# Patient Record
Sex: MALE | Race: BLACK OR AFRICAN AMERICAN | NOT HISPANIC OR LATINO | Employment: OTHER
[De-identification: names, ages, dates, MRNs, and addresses within clinical notes are randomized per-mention and may not be internally consistent; named-entity substitution may affect disease eponyms.]

---

## 2017-01-09 ENCOUNTER — PRIOR ORIGINAL RECORDS (OUTPATIENT)
Dept: OTHER | Age: 67
End: 2017-01-09

## 2017-01-10 ENCOUNTER — MYAURORA ACCOUNT LINK (OUTPATIENT)
Dept: OTHER | Age: 67
End: 2017-01-10

## 2017-01-16 ENCOUNTER — PRIOR ORIGINAL RECORDS (OUTPATIENT)
Dept: OTHER | Age: 67
End: 2017-01-16

## 2017-01-23 ENCOUNTER — APPOINTMENT (OUTPATIENT)
Dept: PHYSICAL THERAPY | Facility: HOSPITAL | Age: 67
End: 2017-01-23
Attending: FAMILY MEDICINE

## 2017-01-23 ENCOUNTER — TELEPHONE (OUTPATIENT)
Dept: PHYSICAL THERAPY | Facility: HOSPITAL | Age: 67
End: 2017-01-23

## 2017-01-25 ENCOUNTER — APPOINTMENT (OUTPATIENT)
Dept: PHYSICAL THERAPY | Facility: HOSPITAL | Age: 67
End: 2017-01-25

## 2017-01-30 ENCOUNTER — APPOINTMENT (OUTPATIENT)
Dept: PHYSICAL THERAPY | Facility: HOSPITAL | Age: 67
End: 2017-01-30

## 2017-02-01 ENCOUNTER — APPOINTMENT (OUTPATIENT)
Dept: PHYSICAL THERAPY | Facility: HOSPITAL | Age: 67
End: 2017-02-01

## 2017-02-06 ENCOUNTER — APPOINTMENT (OUTPATIENT)
Dept: PHYSICAL THERAPY | Facility: HOSPITAL | Age: 67
End: 2017-02-06

## 2017-02-08 ENCOUNTER — APPOINTMENT (OUTPATIENT)
Dept: PHYSICAL THERAPY | Facility: HOSPITAL | Age: 67
End: 2017-02-08

## 2017-02-13 ENCOUNTER — APPOINTMENT (OUTPATIENT)
Dept: PHYSICAL THERAPY | Facility: HOSPITAL | Age: 67
End: 2017-02-13

## 2017-02-17 ENCOUNTER — PRIOR ORIGINAL RECORDS (OUTPATIENT)
Dept: OTHER | Age: 67
End: 2017-02-17

## 2017-10-23 ENCOUNTER — PRIOR ORIGINAL RECORDS (OUTPATIENT)
Dept: OTHER | Age: 67
End: 2017-10-23

## 2018-02-16 ENCOUNTER — PRIOR ORIGINAL RECORDS (OUTPATIENT)
Dept: OTHER | Age: 68
End: 2018-02-16

## 2018-02-16 LAB
ALBUMIN: 4.5 G/DL
ALT (SGPT): 77 U/L
AST (SGOT): 48 U/L
BILIRUBIN TOTAL: 0.5 MG/DL
BUN: 15 MG/DL
CALCIUM: 9.8 MG/DL
CHLORIDE: 103 MEQ/L
CHOLESTEROL, TOTAL: 233 MG/DL
CREATININE, SERUM: 0.9 MG/DL
GLUCOSE: 102 MG/DL
GLUCOSE: 102 MG/DL
HDL CHOLESTEROL: 63 MG/DL
HEMOGLOBIN A1C: 5.5 %
LDL CHOLESTEROL: 153 MG/DL
MAGNESIUM: 1.6 MG/DL
POTASSIUM, SERUM: 4.1 MEQ/L
PROTEIN, TOTAL: 7.2 G/DL
SGOT (AST): 48 IU/L
SGPT (ALT): 77 IU/L
SODIUM: 141 MEQ/L
TRIGLYCERIDES: 83 MG/DL
VITAMIN D 25-OH: 38 NG/ML

## 2018-10-26 ENCOUNTER — PRIOR ORIGINAL RECORDS (OUTPATIENT)
Dept: OTHER | Age: 68
End: 2018-10-26

## 2018-11-06 ENCOUNTER — MYAURORA ACCOUNT LINK (OUTPATIENT)
Dept: OTHER | Age: 68
End: 2018-11-06

## 2018-11-06 ENCOUNTER — PRIOR ORIGINAL RECORDS (OUTPATIENT)
Dept: OTHER | Age: 68
End: 2018-11-06

## 2018-11-24 ENCOUNTER — PRIOR ORIGINAL RECORDS (OUTPATIENT)
Dept: OTHER | Age: 68
End: 2018-11-24

## 2018-11-29 ENCOUNTER — PRIOR ORIGINAL RECORDS (OUTPATIENT)
Dept: OTHER | Age: 68
End: 2018-11-29

## 2018-11-29 ENCOUNTER — MYAURORA ACCOUNT LINK (OUTPATIENT)
Dept: OTHER | Age: 68
End: 2018-11-29

## 2018-11-29 LAB
ALBUMIN: 4.6 G/DL
ALKALINE PHOSPHATATE(ALK PHOS): 55 IU/L
ALT (SGPT): 44 U/L
AST (SGOT): 35 U/L
BILIRUBIN TOTAL: 0.7 MG/DL
BUN: 14 MG/DL
CALCIUM: 9.9 MG/DL
CHLORIDE: 105 MEQ/L
CHOLESTEROL, TOTAL: 240 MG/DL
CREATININE, SERUM: 1 MG/DL
FREE T4: 1.08 MG/DL
GLUCOSE: 103 MG/DL
GLUCOSE: 103 MG/DL
HDL CHOLESTEROL: 80 MG/DL
HEMOGLOBIN A1C: 5.6 %
LDL CHOLESTEROL: 145 MG/DL
POTASSIUM, SERUM: 4.4 MEQ/L
PROTEIN, TOTAL: 7.8 G/DL
SODIUM: 148 MEQ/L
TRIGLYCERIDES: 76 MG/DL

## 2018-11-30 ENCOUNTER — MYAURORA ACCOUNT LINK (OUTPATIENT)
Dept: OTHER | Age: 68
End: 2018-11-30

## 2018-11-30 ENCOUNTER — PRIOR ORIGINAL RECORDS (OUTPATIENT)
Dept: OTHER | Age: 68
End: 2018-11-30

## 2018-12-01 ENCOUNTER — PRIOR ORIGINAL RECORDS (OUTPATIENT)
Dept: OTHER | Age: 68
End: 2018-12-01

## 2019-02-06 ENCOUNTER — PRIOR ORIGINAL RECORDS (OUTPATIENT)
Dept: OTHER | Age: 69
End: 2019-02-06

## 2019-02-06 ENCOUNTER — MYAURORA ACCOUNT LINK (OUTPATIENT)
Dept: OTHER | Age: 69
End: 2019-02-06

## 2019-02-25 ENCOUNTER — PRIOR ORIGINAL RECORDS (OUTPATIENT)
Dept: OTHER | Age: 69
End: 2019-02-25

## 2019-02-28 VITALS
HEART RATE: 64 BPM | HEIGHT: 74 IN | DIASTOLIC BLOOD PRESSURE: 92 MMHG | WEIGHT: 203 LBS | BODY MASS INDEX: 26.05 KG/M2 | SYSTOLIC BLOOD PRESSURE: 186 MMHG | RESPIRATION RATE: 16 BRPM

## 2019-02-28 VITALS
BODY MASS INDEX: 26.31 KG/M2 | HEART RATE: 64 BPM | SYSTOLIC BLOOD PRESSURE: 119 MMHG | HEIGHT: 74 IN | WEIGHT: 205 LBS | DIASTOLIC BLOOD PRESSURE: 66 MMHG

## 2019-02-28 VITALS
BODY MASS INDEX: 27.17 KG/M2 | HEART RATE: 62 BPM | DIASTOLIC BLOOD PRESSURE: 88 MMHG | WEIGHT: 205 LBS | RESPIRATION RATE: 16 BRPM | SYSTOLIC BLOOD PRESSURE: 162 MMHG | HEIGHT: 73 IN

## 2019-02-28 VITALS
SYSTOLIC BLOOD PRESSURE: 160 MMHG | DIASTOLIC BLOOD PRESSURE: 96 MMHG | HEART RATE: 80 BPM | HEIGHT: 73 IN | WEIGHT: 202 LBS | BODY MASS INDEX: 26.77 KG/M2

## 2019-03-01 VITALS
WEIGHT: 199.5 LBS | HEART RATE: 59 BPM | HEIGHT: 74 IN | SYSTOLIC BLOOD PRESSURE: 129 MMHG | DIASTOLIC BLOOD PRESSURE: 59 MMHG | RESPIRATION RATE: 16 BRPM | BODY MASS INDEX: 25.6 KG/M2

## 2019-03-01 VITALS
SYSTOLIC BLOOD PRESSURE: 130 MMHG | DIASTOLIC BLOOD PRESSURE: 74 MMHG | HEART RATE: 64 BPM | RESPIRATION RATE: 16 BRPM | BODY MASS INDEX: 26.18 KG/M2 | WEIGHT: 204 LBS | HEIGHT: 74 IN

## 2019-03-04 RX ORDER — LOSARTAN POTASSIUM AND HYDROCHLOROTHIAZIDE 12.5; 1 MG/1; MG/1
TABLET ORAL
COMMUNITY
Start: 2018-10-26 | End: 2021-06-22 | Stop reason: ALTCHOICE

## 2019-03-04 RX ORDER — METHYLDOPA 250 MG
TABLET ORAL
COMMUNITY
Start: 2017-01-09 | End: 2020-06-04

## 2019-03-04 RX ORDER — DESVENLAFAXINE 50 MG/1
TABLET, EXTENDED RELEASE ORAL
COMMUNITY
Start: 2018-11-06 | End: 2019-07-12 | Stop reason: CLARIF

## 2019-03-04 RX ORDER — VITAMIN B COMPLEX
TABLET ORAL
COMMUNITY
Start: 2017-01-09

## 2019-03-04 RX ORDER — AMLODIPINE BESYLATE 10 MG/1
TABLET ORAL
COMMUNITY
Start: 2018-12-14 | End: 2019-04-12 | Stop reason: ALTCHOICE

## 2019-03-08 ENCOUNTER — OFFICE VISIT (OUTPATIENT)
Dept: CARDIOLOGY | Age: 69
End: 2019-03-08

## 2019-03-08 VITALS
BODY MASS INDEX: 26.64 KG/M2 | WEIGHT: 201 LBS | HEART RATE: 68 BPM | SYSTOLIC BLOOD PRESSURE: 110 MMHG | DIASTOLIC BLOOD PRESSURE: 70 MMHG | HEIGHT: 73 IN

## 2019-03-08 DIAGNOSIS — S15.092A: ICD-10-CM

## 2019-03-08 DIAGNOSIS — I25.10 CAD IN NATIVE ARTERY: Primary | ICD-10-CM

## 2019-03-08 DIAGNOSIS — E78.00 PURE HYPERCHOLESTEROLEMIA: ICD-10-CM

## 2019-03-08 DIAGNOSIS — I10 ELEVATED BLOOD PRESSURE READING IN OFFICE WITH WHITE COAT SYNDROME, WITH DIAGNOSIS OF HYPERTENSION: ICD-10-CM

## 2019-03-08 PROCEDURE — 99214 OFFICE O/P EST MOD 30 MIN: CPT | Performed by: INTERNAL MEDICINE

## 2019-03-08 PROCEDURE — 3074F SYST BP LT 130 MM HG: CPT | Performed by: INTERNAL MEDICINE

## 2019-03-08 PROCEDURE — 3078F DIAST BP <80 MM HG: CPT | Performed by: INTERNAL MEDICINE

## 2019-03-08 SDOH — HEALTH STABILITY: PHYSICAL HEALTH: ON AVERAGE, HOW MANY DAYS PER WEEK DO YOU ENGAGE IN MODERATE TO STRENUOUS EXERCISE (LIKE A BRISK WALK)?: 0 DAYS

## 2019-03-08 SDOH — HEALTH STABILITY: MENTAL HEALTH: HOW OFTEN DO YOU HAVE A DRINK CONTAINING ALCOHOL?: NEVER

## 2019-03-08 SDOH — HEALTH STABILITY: PHYSICAL HEALTH: ON AVERAGE, HOW MANY MINUTES DO YOU ENGAGE IN EXERCISE AT THIS LEVEL?: 0 MIN

## 2019-03-08 ASSESSMENT — ENCOUNTER SYMPTOMS
CHILLS: 0
BRUISES/BLEEDS EASILY: 0
HEMATOCHEZIA: 0
HEMOPTYSIS: 0
COUGH: 0
WEIGHT GAIN: 0
WEIGHT LOSS: 0
ALLERGIC/IMMUNOLOGIC COMMENTS: NO NEW FOOD ALLERGIES
FEVER: 0
SUSPICIOUS LESIONS: 0

## 2019-03-19 ENCOUNTER — DOCUMENTATION (OUTPATIENT)
Dept: CARDIOLOGY | Age: 69
End: 2019-03-19

## 2019-03-21 ENCOUNTER — TELEPHONE (OUTPATIENT)
Dept: CARDIOLOGY | Age: 69
End: 2019-03-21

## 2019-04-12 ENCOUNTER — OFFICE VISIT (OUTPATIENT)
Dept: CARDIOLOGY | Age: 69
End: 2019-04-12

## 2019-04-12 VITALS
HEIGHT: 73 IN | BODY MASS INDEX: 27.04 KG/M2 | RESPIRATION RATE: 16 BRPM | DIASTOLIC BLOOD PRESSURE: 100 MMHG | HEART RATE: 64 BPM | SYSTOLIC BLOOD PRESSURE: 168 MMHG | WEIGHT: 204 LBS

## 2019-04-12 DIAGNOSIS — E78.00 PURE HYPERCHOLESTEROLEMIA: ICD-10-CM

## 2019-04-12 DIAGNOSIS — I25.10 CAD IN NATIVE ARTERY: Primary | ICD-10-CM

## 2019-04-12 DIAGNOSIS — I10 WHITE COAT SYNDROME WITH DIAGNOSIS OF HYPERTENSION: ICD-10-CM

## 2019-04-12 DIAGNOSIS — I10 ELEVATED BLOOD PRESSURE READING IN OFFICE WITH WHITE COAT SYNDROME, WITH DIAGNOSIS OF HYPERTENSION: ICD-10-CM

## 2019-04-12 PROCEDURE — 3080F DIAST BP >= 90 MM HG: CPT | Performed by: NURSE PRACTITIONER

## 2019-04-12 PROCEDURE — 99214 OFFICE O/P EST MOD 30 MIN: CPT | Performed by: NURSE PRACTITIONER

## 2019-04-12 PROCEDURE — 3077F SYST BP >= 140 MM HG: CPT | Performed by: NURSE PRACTITIONER

## 2019-04-12 RX ORDER — MAGNESIUM OXIDE 400 MG/1
400 TABLET ORAL
COMMUNITY

## 2019-04-12 RX ORDER — HYDROCODONE BITARTRATE AND ACETAMINOPHEN 5; 325 MG/1; MG/1
1 TABLET ORAL
COMMUNITY
Start: 2019-04-03 | End: 2019-07-12 | Stop reason: CLARIF

## 2019-04-12 RX ORDER — HYDRALAZINE HYDROCHLORIDE 25 MG/1
25 TABLET, FILM COATED ORAL 2 TIMES DAILY
Qty: 60 TABLET | Refills: 1 | Status: SHIPPED | OUTPATIENT
Start: 2019-04-12 | End: 2019-05-07 | Stop reason: SDUPTHER

## 2019-04-12 RX ORDER — TADALAFIL 5 MG/1
5 TABLET ORAL
COMMUNITY

## 2019-04-12 RX ORDER — MULTIVITAMIN
1 TABLET ORAL
COMMUNITY

## 2019-04-12 RX ORDER — NICOTINE 14MG/24HR
250 PATCH, TRANSDERMAL 24 HOURS TRANSDERMAL
COMMUNITY

## 2019-04-12 SDOH — HEALTH STABILITY: MENTAL HEALTH: HOW OFTEN DO YOU HAVE A DRINK CONTAINING ALCOHOL?: NEVER

## 2019-05-07 DIAGNOSIS — I10 ELEVATED BLOOD PRESSURE READING IN OFFICE WITH WHITE COAT SYNDROME, WITH DIAGNOSIS OF HYPERTENSION: ICD-10-CM

## 2019-05-08 RX ORDER — HYDRALAZINE HYDROCHLORIDE 25 MG/1
TABLET, FILM COATED ORAL
Qty: 60 TABLET | Refills: 5 | Status: SHIPPED | OUTPATIENT
Start: 2019-05-08 | End: 2019-05-24

## 2019-05-14 DIAGNOSIS — I10 ELEVATED BLOOD PRESSURE READING IN OFFICE WITH WHITE COAT SYNDROME, WITH DIAGNOSIS OF HYPERTENSION: ICD-10-CM

## 2019-05-14 RX ORDER — HYDRALAZINE HYDROCHLORIDE 25 MG/1
25 TABLET, FILM COATED ORAL 2 TIMES DAILY
Qty: 60 TABLET | Refills: 5 | OUTPATIENT
Start: 2019-05-14

## 2019-05-15 DIAGNOSIS — I10 ELEVATED BLOOD PRESSURE READING IN OFFICE WITH WHITE COAT SYNDROME, WITH DIAGNOSIS OF HYPERTENSION: ICD-10-CM

## 2019-05-15 RX ORDER — HYDRALAZINE HYDROCHLORIDE 25 MG/1
25 TABLET, FILM COATED ORAL 2 TIMES DAILY
Qty: 60 TABLET | Refills: 5 | OUTPATIENT
Start: 2019-05-15

## 2019-05-22 ENCOUNTER — ANCILLARY PROCEDURE (OUTPATIENT)
Dept: CARDIOLOGY | Age: 69
End: 2019-05-22
Attending: NURSE PRACTITIONER

## 2019-05-22 DIAGNOSIS — I10 ELEVATED BLOOD PRESSURE READING IN OFFICE WITH WHITE COAT SYNDROME, WITH DIAGNOSIS OF HYPERTENSION: ICD-10-CM

## 2019-05-22 PROCEDURE — 93975 VASCULAR STUDY: CPT | Performed by: INTERNAL MEDICINE

## 2019-05-24 ENCOUNTER — OFFICE VISIT (OUTPATIENT)
Dept: CARDIOLOGY | Age: 69
End: 2019-05-24

## 2019-05-24 VITALS
SYSTOLIC BLOOD PRESSURE: 124 MMHG | BODY MASS INDEX: 27.3 KG/M2 | HEIGHT: 73 IN | DIASTOLIC BLOOD PRESSURE: 72 MMHG | HEART RATE: 64 BPM | RESPIRATION RATE: 16 BRPM | WEIGHT: 206 LBS

## 2019-05-24 DIAGNOSIS — E78.00 PURE HYPERCHOLESTEROLEMIA: ICD-10-CM

## 2019-05-24 DIAGNOSIS — I25.10 CAD IN NATIVE ARTERY: Primary | ICD-10-CM

## 2019-05-24 DIAGNOSIS — I10 ELEVATED BLOOD PRESSURE READING IN OFFICE WITH WHITE COAT SYNDROME, WITH DIAGNOSIS OF HYPERTENSION: ICD-10-CM

## 2019-05-24 PROCEDURE — 99214 OFFICE O/P EST MOD 30 MIN: CPT | Performed by: INTERNAL MEDICINE

## 2019-05-24 PROCEDURE — 3074F SYST BP LT 130 MM HG: CPT | Performed by: INTERNAL MEDICINE

## 2019-05-24 PROCEDURE — 3078F DIAST BP <80 MM HG: CPT | Performed by: INTERNAL MEDICINE

## 2019-05-24 SDOH — HEALTH STABILITY: PHYSICAL HEALTH: ON AVERAGE, HOW MANY MINUTES DO YOU ENGAGE IN EXERCISE AT THIS LEVEL?: 0 MIN

## 2019-05-24 SDOH — HEALTH STABILITY: MENTAL HEALTH: HOW OFTEN DO YOU HAVE A DRINK CONTAINING ALCOHOL?: NEVER

## 2019-05-24 SDOH — HEALTH STABILITY: PHYSICAL HEALTH: ON AVERAGE, HOW MANY DAYS PER WEEK DO YOU ENGAGE IN MODERATE TO STRENUOUS EXERCISE (LIKE A BRISK WALK)?: 0 DAYS

## 2019-05-24 ASSESSMENT — ENCOUNTER SYMPTOMS
HEMOPTYSIS: 0
FEVER: 0
BACK PAIN: 1
COUGH: 0
BRUISES/BLEEDS EASILY: 0
WEIGHT GAIN: 0
WEIGHT LOSS: 0
ALLERGIC/IMMUNOLOGIC COMMENTS: NO NEW FOOD ALLERGIES
CHILLS: 0
SUSPICIOUS LESIONS: 0
HEMATOCHEZIA: 0

## 2019-06-07 ENCOUNTER — ANCILLARY PROCEDURE (OUTPATIENT)
Dept: CARDIOLOGY | Age: 69
End: 2019-06-07
Attending: INTERNAL MEDICINE

## 2019-06-07 DIAGNOSIS — S15.092A: ICD-10-CM

## 2019-06-07 DIAGNOSIS — I25.10 CAD IN NATIVE ARTERY: ICD-10-CM

## 2019-06-07 DIAGNOSIS — I10 ELEVATED BLOOD PRESSURE READING IN OFFICE WITH WHITE COAT SYNDROME, WITH DIAGNOSIS OF HYPERTENSION: ICD-10-CM

## 2019-06-07 DIAGNOSIS — E78.00 PURE HYPERCHOLESTEROLEMIA: ICD-10-CM

## 2019-06-07 PROCEDURE — 93880 EXTRACRANIAL BILAT STUDY: CPT | Performed by: INTERNAL MEDICINE

## 2019-06-14 ENCOUNTER — APPOINTMENT (OUTPATIENT)
Dept: CARDIOLOGY | Age: 69
End: 2019-06-14

## 2019-06-14 ENCOUNTER — TELEPHONE (OUTPATIENT)
Dept: CARDIOLOGY | Age: 69
End: 2019-06-14

## 2019-06-19 ENCOUNTER — TELEPHONE (OUTPATIENT)
Dept: CARDIOLOGY | Age: 69
End: 2019-06-19

## 2019-07-05 LAB
ABSOLUTE IMMATURE GRANULOCYTES (OFFPRE24): NORMAL
ALBUMIN SERPL-MCNC: 4.3 G/DL
ALBUMIN/GLOB SERPL: NORMAL {RATIO}
ALP SERPL-CCNC: 62 U/L
ALT SERPL-CCNC: 69 U/L
ANION GAP SERPL CALC-SCNC: NORMAL MMOL/L
AST SERPL-CCNC: 48 U/L
BASO+EOS+MONOS # BLD: NORMAL 10*3/UL
BASO+EOS+MONOS NFR BLD: NORMAL %
BASOPHILS # BLD: NORMAL 10*3/UL
BASOPHILS NFR BLD: NORMAL %
BILIRUB SERPL-MCNC: 0.6 MG/DL
BUN SERPL-MCNC: 12 MG/DL
BUN/CREAT SERPL: NORMAL
CALCIUM SERPL-MCNC: 10 MG/DL
CHLORIDE SERPL-SCNC: 103 MMOL/L
CHOLEST SERPL-MCNC: 207 MG/DL
CHOLEST/HDLC SERPL: NORMAL {RATIO}
CK SERPL-CCNC: 387 U/L
CO2 SERPL-SCNC: NORMAL MMOL/L
CREAT SERPL-MCNC: 1 MG/DL
DIFFERENTIAL METHOD BLD: NORMAL
EOSINOPHIL # BLD: NORMAL 10*3/UL
EOSINOPHIL NFR BLD: NORMAL %
ERYTHROCYTE [DISTWIDTH] IN BLOOD: NORMAL %
EST. AVERAGE GLUCOSE BLD GHB EST-MCNC: NORMAL MG/DL
GLOBULIN SER-MCNC: NORMAL G/DL
GLUCOSE SERPL-MCNC: 101 MG/DL
HBA1C MFR BLD: 5.4 %
HBA1C MFR BLD: NORMAL % (ref 4.5–5.6)
HCT VFR BLD CALC: 38.5 %
HDLC SERPL-MCNC: 66 MG/DL
HGB BLD-MCNC: 12.8 G/DL
IMMATURE GRANULOCYTES (OFFPRE25): NORMAL
LDLC SERPL CALC-MCNC: 127 MG/DL
LENGTH OF FAST TIME PATIENT: NORMAL H
LENGTH OF FAST TIME PATIENT: NORMAL H
LYMPHOCYTES # BLD: NORMAL 10*3/UL
LYMPHOCYTES NFR BLD: NORMAL %
MAGNESIUM SERPL-MCNC: 1.7 MG/DL
MCH RBC QN AUTO: NORMAL PG
MCHC RBC AUTO-ENTMCNC: NORMAL G/DL
MCV RBC AUTO: NORMAL FL
MONOCYTES # BLD: NORMAL 10*3/UL
MONOCYTES NFR BLD: NORMAL %
MPV (OFFPRE2): NORMAL
NEUTROPHILS # BLD: NORMAL 10*3/UL
NEUTROPHILS NFR BLD: NORMAL %
NONHDLC SERPL-MCNC: 141 MG/DL
NRBC BLD MANUAL-RTO: NORMAL %
PLAT MORPH BLD: NORMAL
PLATELET # BLD: 165 10*3/UL
POTASSIUM SERPL-SCNC: 4.4 MMOL/L
PROT SERPL-MCNC: 7.2 G/DL
RBC # BLD: 4.34 10*6/UL
RBC MORPH BLD: NORMAL
SODIUM SERPL-SCNC: 140 MMOL/L
T4 FREE SERPL-MCNC: 1.06 NG/DL
TRIGL SERPL-MCNC: 68 MG/DL
TSH SERPL-ACNC: 2.45 M[IU]/L
VLDLC SERPL CALC-MCNC: NORMAL MG/DL
WBC # BLD: 3.4 10*3/UL
WBC MORPH BLD: NORMAL

## 2019-07-12 ENCOUNTER — OFFICE VISIT (OUTPATIENT)
Dept: CARDIOLOGY | Age: 69
End: 2019-07-12

## 2019-07-12 VITALS
HEART RATE: 60 BPM | HEIGHT: 73 IN | DIASTOLIC BLOOD PRESSURE: 100 MMHG | BODY MASS INDEX: 26.51 KG/M2 | SYSTOLIC BLOOD PRESSURE: 160 MMHG | WEIGHT: 200 LBS

## 2019-07-12 DIAGNOSIS — E78.00 PURE HYPERCHOLESTEROLEMIA: ICD-10-CM

## 2019-07-12 DIAGNOSIS — I65.23 BILATERAL CAROTID ARTERY STENOSIS: ICD-10-CM

## 2019-07-12 DIAGNOSIS — I10 ELEVATED BLOOD PRESSURE READING IN OFFICE WITH WHITE COAT SYNDROME, WITH DIAGNOSIS OF HYPERTENSION: ICD-10-CM

## 2019-07-12 DIAGNOSIS — I25.10 CAD IN NATIVE ARTERY: Primary | ICD-10-CM

## 2019-07-12 PROCEDURE — 3077F SYST BP >= 140 MM HG: CPT | Performed by: INTERNAL MEDICINE

## 2019-07-12 PROCEDURE — 99214 OFFICE O/P EST MOD 30 MIN: CPT | Performed by: INTERNAL MEDICINE

## 2019-07-12 PROCEDURE — 3080F DIAST BP >= 90 MM HG: CPT | Performed by: INTERNAL MEDICINE

## 2019-07-12 RX ORDER — CYANOCOBALAMIN (VITAMIN B-12) 2000 MCG
TABLET ORAL
COMMUNITY
End: 2022-09-29

## 2019-07-12 RX ORDER — NIACIN 500 MG/1
500 TABLET, EXTENDED RELEASE ORAL NIGHTLY
Qty: 30 TABLET | Refills: 11 | Status: SHIPPED | OUTPATIENT
Start: 2019-07-12 | End: 2020-03-02 | Stop reason: SDUPTHER

## 2019-07-12 ASSESSMENT — ENCOUNTER SYMPTOMS
COUGH: 0
HEMOPTYSIS: 0
ALLERGIC/IMMUNOLOGIC COMMENTS: NO NEW FOOD ALLERGIES
HEMATOCHEZIA: 0
WEIGHT GAIN: 0
FEVER: 0
BRUISES/BLEEDS EASILY: 0
WEIGHT LOSS: 0
CHILLS: 0
BACK PAIN: 1
SUSPICIOUS LESIONS: 0

## 2019-07-25 ENCOUNTER — IMAGING SERVICES (OUTPATIENT)
Dept: OTHER | Age: 69
End: 2019-07-25
Attending: PHYSICIAN ASSISTANT

## 2019-08-05 ENCOUNTER — CLINICAL ABSTRACT (OUTPATIENT)
Dept: CARDIOLOGY | Age: 69
End: 2019-08-05

## 2019-12-02 ENCOUNTER — OFFICE VISIT (OUTPATIENT)
Dept: GASTROENTEROLOGY | Facility: CLINIC | Age: 69
End: 2019-12-02
Payer: MEDICARE

## 2019-12-02 ENCOUNTER — TELEPHONE (OUTPATIENT)
Dept: GASTROENTEROLOGY | Facility: CLINIC | Age: 69
End: 2019-12-02

## 2019-12-02 VITALS
SYSTOLIC BLOOD PRESSURE: 137 MMHG | TEMPERATURE: 98 F | WEIGHT: 191.19 LBS | BODY MASS INDEX: 24.8 KG/M2 | DIASTOLIC BLOOD PRESSURE: 70 MMHG | HEIGHT: 73.5 IN | HEART RATE: 66 BPM

## 2019-12-02 DIAGNOSIS — Z12.11 ENCOUNTER FOR SCREENING COLONOSCOPY: ICD-10-CM

## 2019-12-02 DIAGNOSIS — R10.9 RIGHT SIDED ABDOMINAL PAIN: Primary | ICD-10-CM

## 2019-12-02 DIAGNOSIS — K21.9 GASTROESOPHAGEAL REFLUX DISEASE, ESOPHAGITIS PRESENCE NOT SPECIFIED: ICD-10-CM

## 2019-12-02 PROCEDURE — 99203 OFFICE O/P NEW LOW 30 MIN: CPT | Performed by: INTERNAL MEDICINE

## 2019-12-02 RX ORDER — HYDROCHLOROTHIAZIDE 12.5 MG/1
12.5 CAPSULE, GELATIN COATED ORAL DAILY
COMMUNITY

## 2019-12-02 RX ORDER — LOSARTAN POTASSIUM 100 MG/1
100 TABLET ORAL DAILY
COMMUNITY

## 2019-12-02 RX ORDER — GARLIC EXTRACT 500 MG
1 CAPSULE ORAL DAILY
COMMUNITY

## 2019-12-02 NOTE — PATIENT INSTRUCTIONS
NEGRA for Violette Busch EGD examination op report January 2017, colonoscopy report likely over 10 years ago    Make a follow-up appt with Dr Gonsales Listen in 4-6 months

## 2019-12-02 NOTE — TELEPHONE ENCOUNTER
Signed NEGRA form faxed to [facility] for pt's last EGD reports.      Fax: [phone]  Phone: [phone]

## 2019-12-04 NOTE — PROGRESS NOTES
HPI:    Patient ID: Diana Fletcher is a 78year old man who I believe I recognize from my previous practice out of [facility].    Very healthy gentleman, with history hypertension and dyslipidemia and spinal stenosis.     Mr. Rox Chávez currently with total cholesterol initially 233–240, recently 210  CMP results 1889–2975 showing AST and ALT as high as 58 and 91 on 5/31/2018, recently 52 and 51 on 1/27/2020.    =======================    6/10/2019  [facility] Biopsies taken of the duodenum. HPI    Review of Systems         Current Outpatient Medications   Medication Sig Dispense Refill   • hydrochlorothiazide 12.5 MG Oral Cap Take 12.5 mg by mouth daily.      • losartan 100 MG Oral Tab Take 100 encounter diagnosis)  Gastroesophageal reflux disease, esophagitis presence not specified    78year old man who I believe I recognize from my previous practice out of [facility].    Very healthy gentleman, with history hypertension and dys 2 previous colonoscopy examinations had shown colon polyps. · Reassuring CBC over at [facility] 7/5/2019  · I recommended colonoscopy examination. We discussed the rationale, nature and risks of colonoscopy examination.   Mr. Giovanna Colon will consider and we will d

## 2019-12-06 ENCOUNTER — OFFICE VISIT (OUTPATIENT)
Dept: CARDIOLOGY | Age: 69
End: 2019-12-06

## 2019-12-06 VITALS
HEIGHT: 73 IN | BODY MASS INDEX: 25.18 KG/M2 | RESPIRATION RATE: 16 BRPM | HEART RATE: 64 BPM | DIASTOLIC BLOOD PRESSURE: 70 MMHG | WEIGHT: 190 LBS | SYSTOLIC BLOOD PRESSURE: 128 MMHG

## 2019-12-06 DIAGNOSIS — I25.10 CAD IN NATIVE ARTERY: Primary | ICD-10-CM

## 2019-12-06 DIAGNOSIS — I10 ELEVATED BLOOD PRESSURE READING IN OFFICE WITH WHITE COAT SYNDROME, WITH DIAGNOSIS OF HYPERTENSION: ICD-10-CM

## 2019-12-06 DIAGNOSIS — E78.00 PURE HYPERCHOLESTEROLEMIA: ICD-10-CM

## 2019-12-06 PROCEDURE — 3074F SYST BP LT 130 MM HG: CPT | Performed by: INTERNAL MEDICINE

## 2019-12-06 PROCEDURE — 99214 OFFICE O/P EST MOD 30 MIN: CPT | Performed by: INTERNAL MEDICINE

## 2019-12-06 PROCEDURE — 3078F DIAST BP <80 MM HG: CPT | Performed by: INTERNAL MEDICINE

## 2019-12-06 SDOH — HEALTH STABILITY: MENTAL HEALTH: HOW OFTEN DO YOU HAVE A DRINK CONTAINING ALCOHOL?: NEVER

## 2019-12-06 SDOH — HEALTH STABILITY: PHYSICAL HEALTH: ON AVERAGE, HOW MANY MINUTES DO YOU ENGAGE IN EXERCISE AT THIS LEVEL?: 0 MIN

## 2019-12-06 SDOH — HEALTH STABILITY: PHYSICAL HEALTH: ON AVERAGE, HOW MANY DAYS PER WEEK DO YOU ENGAGE IN MODERATE TO STRENUOUS EXERCISE (LIKE A BRISK WALK)?: 0 DAYS

## 2019-12-06 ASSESSMENT — ENCOUNTER SYMPTOMS
COUGH: 0
ALLERGIC/IMMUNOLOGIC COMMENTS: NO NEW FOOD ALLERGIES
FEVER: 0
SUSPICIOUS LESIONS: 0
WEIGHT GAIN: 0
WEIGHT LOSS: 0
HEMATOCHEZIA: 0
CHILLS: 0
BACK PAIN: 1
BRUISES/BLEEDS EASILY: 0
HEMOPTYSIS: 0

## 2019-12-06 ASSESSMENT — PATIENT HEALTH QUESTIONNAIRE - PHQ9
SUM OF ALL RESPONSES TO PHQ9 QUESTIONS 1 AND 2: 0
1. LITTLE INTEREST OR PLEASURE IN DOING THINGS: NOT AT ALL
2. FEELING DOWN, DEPRESSED OR HOPELESS: NOT AT ALL
SUM OF ALL RESPONSES TO PHQ9 QUESTIONS 1 AND 2: 0

## 2019-12-16 ENCOUNTER — TELEPHONE (OUTPATIENT)
Dept: GASTROENTEROLOGY | Facility: CLINIC | Age: 69
End: 2019-12-16

## 2019-12-16 NOTE — TELEPHONE ENCOUNTER
I spoke to the pt and he was given Dr Spence's below recommendations and f/u instructions and verbalizes understanding.  He will call back if symptoms worsen or fail to improve

## 2019-12-16 NOTE — TELEPHONE ENCOUNTER
Patient requesting to speak with  regarding gas and fecal output when leaving out gas, happening throughout the day. Please call at:[phone],thanks.

## 2019-12-16 NOTE — TELEPHONE ENCOUNTER
Dr Dorothy Leggett with you was 12/02/19    About one week ago pt noted he started to experience a large amount of gas and non bloody diarrhea stools throughout the day.  He states it's mostly gas and small amounts of stool    About 90 days ago he became

## 2019-12-16 NOTE — TELEPHONE ENCOUNTER
Thank you Lizzeth Miles for speaking to Mr Kimberly Beal. Please call him and advise him that this change is from one of the vegetable or fruit regimens or juices he is taking. Some vegetables and fruits cause gas and diarrhea.   We had discussed during the recent office

## 2020-03-03 RX ORDER — NIACIN 500 MG/1
TABLET, EXTENDED RELEASE ORAL
Qty: 90 TABLET | Refills: 3 | Status: SHIPPED | OUTPATIENT
Start: 2020-03-03 | End: 2020-08-28 | Stop reason: SDUPTHER

## 2020-06-01 ENCOUNTER — ANCILLARY PROCEDURE (OUTPATIENT)
Dept: CARDIOLOGY | Age: 70
End: 2020-06-01
Attending: INTERNAL MEDICINE

## 2020-06-01 DIAGNOSIS — I65.23 BILATERAL CAROTID ARTERY STENOSIS: ICD-10-CM

## 2020-06-01 PROCEDURE — 93880 EXTRACRANIAL BILAT STUDY: CPT | Performed by: INTERNAL MEDICINE

## 2020-06-04 ENCOUNTER — OFFICE VISIT (OUTPATIENT)
Dept: CARDIOLOGY | Age: 70
End: 2020-06-04

## 2020-06-04 VITALS
SYSTOLIC BLOOD PRESSURE: 137 MMHG | WEIGHT: 187.8 LBS | HEART RATE: 48 BPM | DIASTOLIC BLOOD PRESSURE: 89 MMHG | BODY MASS INDEX: 24.78 KG/M2

## 2020-06-04 DIAGNOSIS — E78.00 PURE HYPERCHOLESTEROLEMIA: Primary | ICD-10-CM

## 2020-06-04 DIAGNOSIS — I25.10 CAD IN NATIVE ARTERY: ICD-10-CM

## 2020-06-04 PROCEDURE — 99442 TELEPHONE E&M BY PHYSICIAN EST PT NOT ORIG PREV 7 DAYS 11-20 MIN: CPT | Performed by: INTERNAL MEDICINE

## 2020-06-04 SDOH — HEALTH STABILITY: PHYSICAL HEALTH: ON AVERAGE, HOW MANY MINUTES DO YOU ENGAGE IN EXERCISE AT THIS LEVEL?: NOT ASKED

## 2020-06-04 SDOH — HEALTH STABILITY: MENTAL HEALTH: HOW OFTEN DO YOU HAVE A DRINK CONTAINING ALCOHOL?: NEVER

## 2020-06-04 SDOH — HEALTH STABILITY: PHYSICAL HEALTH: ON AVERAGE, HOW MANY DAYS PER WEEK DO YOU ENGAGE IN MODERATE TO STRENUOUS EXERCISE (LIKE A BRISK WALK)?: 5 DAYS

## 2020-06-04 ASSESSMENT — PATIENT HEALTH QUESTIONNAIRE - PHQ9
1. LITTLE INTEREST OR PLEASURE IN DOING THINGS: NOT AT ALL
CLINICAL INTERPRETATION OF PHQ2 SCORE: NO FURTHER SCREENING NEEDED
SUM OF ALL RESPONSES TO PHQ9 QUESTIONS 1 AND 2: 0
CLINICAL INTERPRETATION OF PHQ9 SCORE: NO FURTHER SCREENING NEEDED
SUM OF ALL RESPONSES TO PHQ9 QUESTIONS 1 AND 2: 0
2. FEELING DOWN, DEPRESSED OR HOPELESS: NOT AT ALL

## 2020-06-04 ASSESSMENT — ENCOUNTER SYMPTOMS
WEIGHT LOSS: 0
COUGH: 0
BRUISES/BLEEDS EASILY: 0
CHILLS: 0
HEMOPTYSIS: 0
SUSPICIOUS LESIONS: 0
ALLERGIC/IMMUNOLOGIC COMMENTS: NO NEW FOOD ALLERGIES
FEVER: 0
WEIGHT GAIN: 0
HEMATOCHEZIA: 0

## 2020-06-08 ENCOUNTER — OFFICE VISIT (OUTPATIENT)
Dept: GASTROENTEROLOGY | Facility: CLINIC | Age: 70
End: 2020-06-08
Payer: MEDICARE

## 2020-06-08 ENCOUNTER — LAB ENCOUNTER (OUTPATIENT)
Dept: LAB | Facility: HOSPITAL | Age: 70
End: 2020-06-08
Attending: INTERNAL MEDICINE
Payer: MEDICARE

## 2020-06-08 ENCOUNTER — TELEPHONE (OUTPATIENT)
Dept: GASTROENTEROLOGY | Facility: CLINIC | Age: 70
End: 2020-06-08

## 2020-06-08 VITALS
WEIGHT: 192.19 LBS | HEART RATE: 70 BPM | SYSTOLIC BLOOD PRESSURE: 192 MMHG | BODY MASS INDEX: 24.93 KG/M2 | HEIGHT: 73.5 IN | DIASTOLIC BLOOD PRESSURE: 85 MMHG

## 2020-06-08 DIAGNOSIS — R74.8 ABNORMAL TRANSAMINASES: ICD-10-CM

## 2020-06-08 DIAGNOSIS — R74.8 ABNORMAL TRANSAMINASES: Primary | ICD-10-CM

## 2020-06-08 DIAGNOSIS — R19.4 CHANGE IN BOWEL HABITS: ICD-10-CM

## 2020-06-08 DIAGNOSIS — R10.9 RIGHT SIDED ABDOMINAL PAIN: ICD-10-CM

## 2020-06-08 DIAGNOSIS — Z12.11 COLON CANCER SCREENING: Primary | ICD-10-CM

## 2020-06-08 PROCEDURE — 86706 HEP B SURFACE ANTIBODY: CPT

## 2020-06-08 PROCEDURE — 86708 HEPATITIS A ANTIBODY: CPT

## 2020-06-08 PROCEDURE — 36415 COLL VENOUS BLD VENIPUNCTURE: CPT

## 2020-06-08 PROCEDURE — 80500 HEPATITIS A B + C PROFILE: CPT

## 2020-06-08 PROCEDURE — 86709 HEPATITIS A IGM ANTIBODY: CPT

## 2020-06-08 PROCEDURE — 80053 COMPREHEN METABOLIC PANEL: CPT

## 2020-06-08 PROCEDURE — 99214 OFFICE O/P EST MOD 30 MIN: CPT | Performed by: INTERNAL MEDICINE

## 2020-06-08 PROCEDURE — 87340 HEPATITIS B SURFACE AG IA: CPT

## 2020-06-08 PROCEDURE — 86704 HEP B CORE ANTIBODY TOTAL: CPT

## 2020-06-08 PROCEDURE — 86803 HEPATITIS C AB TEST: CPT

## 2020-06-08 RX ORDER — TADALAFIL 5 MG/1
5 TABLET ORAL DAILY
COMMUNITY
Start: 2020-03-02

## 2020-06-08 NOTE — PROGRESS NOTES
HPI:    Patient ID: Vicky Yadav is a 78year old man who I have likely known for over 10 years who returns today for follow-up. In December 2019, Daisy Enamorado was very upset about new diagnosis of significant carotid vascular disease.   Part of his plan to diarrhea. We had discussed during the recent office visit cutting back on his supplements to the bare minimum. One of his supplements is magnesium which causes diarrhea. He should hold the magnesium for now if he has been taking it.   Same thing goes for at: [phone],[id number].      ======================    Previous visit 12/2/2019:    Kalee Soto is a 78year old man who I believe I recognize from my previous practice out of [facility].    Very healthy gentleman, with history hypertens received from [facility]:    CBCs 2858–2988 showing hemoglobin running 12.5g – 13.8g  Lipid profiles with total cholesterol initially 233–240, recently 210  CMP results 1962–0518 showing AST and ALT as high as 58 and 91 on 5/31/2018, recently 52 and 5  16-49% Right Internal Carotid Artery Stenosis. 2.  1-15% Left Internal Carotid Artery Stenosis. 3.  Patent vertebral arteries bilaterally with antegrade doppler flow. 4.  Abnormal thyroid tissue on the left, correlate clinically.   Compared to the previ (VITAMIN D3) 5000 units Oral Tab 1 tablet daily     • Cyanocobalamin (B-12) 2000 MCG Oral Tab Take by mouth. • Coenzyme Q10 (COQ10) 100 MG Oral Cap 1 capsule daily     • magnesium oxide 400 MG Oral Tab Take 400 mg by mouth daily.        • Multiple Vitam showing hemoglobin running 12.5g – 13.8g  Lipid profiles with total cholesterol initially 233–240, recently 210  CMP results [id number]–[id number] showing AST and ALT as high as 58 and 91 on 5/31/2018, recently 47 and 51 on 1/27/2020.    11/24/2018: AST 35 ALT 44 CPK high as 58 and 91 on 5/31/2018, recently 52 and 51 on 1/27/2020. · Labs reviewed above. · Today's Body mass index is 25.01 kg/m².    · Need to screen for Hepatitis B and C -- discussed and ordered today  · No recent abdominal imaging here or Leon[facility]/Jose Cruz

## 2020-06-08 NOTE — PATIENT INSTRUCTIONS
1.  Blood tests ordered today    2. Liver ultrasound ordered today    3. GI schedulers –    Please schedule colonoscopy exam at [facility] ([address])/ [place]    This patient IS appropriate for the [facility] endoscopy center.

## 2020-06-08 NOTE — TELEPHONE ENCOUNTER
Scheduled for:  Colonoscopy [address]  Provider Name:  Kojo Sherman  Date: 9/8/2020   Location: [facility]   Sedation:Mac    Time: 6703 Pm  (pt is aware to arrive at 1130 Am)   Prep:  Golytely Prep instructions were given to pt in the office, pt verbalized understanding

## 2020-06-16 ENCOUNTER — TELEPHONE (OUTPATIENT)
Dept: GASTROENTEROLOGY | Facility: CLINIC | Age: 70
End: 2020-06-16

## 2020-06-16 ENCOUNTER — APPOINTMENT (OUTPATIENT)
Dept: CARDIOLOGY | Age: 70
End: 2020-06-16

## 2020-06-16 NOTE — TELEPHONE ENCOUNTER
Patient requesting to speak with RN regarding lab results. Patient also requesting results to be sent to his [address]. Please call. Thank you.

## 2020-06-18 ENCOUNTER — HOSPITAL ENCOUNTER (OUTPATIENT)
Dept: ULTRASOUND IMAGING | Age: 70
Discharge: HOME | End: 2020-06-18
Attending: INTERNAL MEDICINE
Payer: MEDICARE

## 2020-06-18 DIAGNOSIS — R74.8 ABNORMAL TRANSAMINASES: ICD-10-CM

## 2020-06-18 PROCEDURE — 76705 ECHO EXAM OF ABDOMEN: CPT | Performed by: INTERNAL MEDICINE

## 2020-06-19 NOTE — TELEPHONE ENCOUNTER
Thanks Markell Shannon. Please call  Richardsonkiana Martinez to advise that the labs of 6/8/2020 looked good. CMP was normal with a minimally, borderline elevated glucose of 106. His liver enzymes looked good with AST 31 ALT 43.   This is a significant improvement compared to

## 2020-06-19 NOTE — TELEPHONE ENCOUNTER
I spoke to the pt and he was notified of Dr Spence's recommendations below and he verbalizes understanding

## 2020-06-26 ENCOUNTER — OFFICE VISIT (OUTPATIENT)
Dept: NEPHROLOGY | Facility: CLINIC | Age: 70
End: 2020-06-26
Payer: MEDICARE

## 2020-06-26 ENCOUNTER — APPOINTMENT (OUTPATIENT)
Dept: LAB | Facility: HOSPITAL | Age: 70
End: 2020-06-26
Attending: INTERNAL MEDICINE
Payer: MEDICARE

## 2020-06-26 VITALS
WEIGHT: 194.81 LBS | BODY MASS INDEX: 25.82 KG/M2 | DIASTOLIC BLOOD PRESSURE: 71 MMHG | HEIGHT: 73 IN | HEART RATE: 65 BPM | SYSTOLIC BLOOD PRESSURE: 161 MMHG

## 2020-06-26 DIAGNOSIS — I10 ESSENTIAL HYPERTENSION: ICD-10-CM

## 2020-06-26 DIAGNOSIS — R80.9 NON-NEPHROTIC RANGE PROTEINURIA: Primary | ICD-10-CM

## 2020-06-26 DIAGNOSIS — R80.9 NON-NEPHROTIC RANGE PROTEINURIA: ICD-10-CM

## 2020-06-26 PROBLEM — N18.2 CKD (CHRONIC KIDNEY DISEASE), STAGE II: Status: ACTIVE | Noted: 2020-06-26

## 2020-06-26 PROCEDURE — 99204 OFFICE O/P NEW MOD 45 MIN: CPT | Performed by: INTERNAL MEDICINE

## 2020-06-26 PROCEDURE — 82570 ASSAY OF URINE CREATININE: CPT

## 2020-06-26 PROCEDURE — 84156 ASSAY OF PROTEIN URINE: CPT

## 2020-06-26 PROCEDURE — 82043 UR ALBUMIN QUANTITATIVE: CPT

## 2020-06-26 PROCEDURE — 81001 URINALYSIS AUTO W/SCOPE: CPT

## 2020-06-26 NOTE — PATIENT INSTRUCTIONS
Urine test as ordered prior to next visit   Follow up in 4 weeks  Bring home blood pressure readings and monitor on next readings

## 2020-06-26 NOTE — PROGRESS NOTES
Consult Requested By: Dr. Esthela Xiong    Reason for Consult: Education on kidneys     HPI:     Patient is a 78 yrs old male with pmh of HTN x 30 yrs, hiatal hernia, spinal stenosis, anxiety who presented to get education about kidneys     Lab tests show Oral Tab Take by mouth. • Coenzyme Q10 (COQ10) 100 MG Oral Cap 1 capsule daily     • magnesium oxide 400 MG Oral Tab Take 400 mg by mouth daily. • Multiple Vitamin Oral Tab Take 1 tablet by mouth. • Milk Thistle 1000 MG Oral Cap daily.      • noted  Head/Face: normocephalic  Eyes/Vision: normal extraocular motion is intact  Nose/Mouth/Throat:mucous membranes are moist   Neck/Thyroid: neck is supple without adenopathy  Lymphatic: no abnormal cervical, supraclavicular adenopathy is noted  Respira

## 2020-06-29 ENCOUNTER — TELEPHONE (OUTPATIENT)
Dept: GASTROENTEROLOGY | Facility: CLINIC | Age: 70
End: 2020-06-29

## 2020-06-29 NOTE — TELEPHONE ENCOUNTER
I mailed out Ultrasound results letter to patient and entered a 1 yr recall for a U/S.     repeat the ultrasound exam in 1-2 years, per Dr Ojeda Dys letter 6/28/2020.

## 2020-07-17 ENCOUNTER — APPOINTMENT (OUTPATIENT)
Dept: CARDIOLOGY | Age: 70
End: 2020-07-17

## 2020-07-23 ENCOUNTER — OFFICE VISIT (OUTPATIENT)
Dept: NEPHROLOGY | Facility: CLINIC | Age: 70
End: 2020-07-23
Payer: MEDICARE

## 2020-07-23 VITALS
HEART RATE: 62 BPM | BODY MASS INDEX: 26.06 KG/M2 | SYSTOLIC BLOOD PRESSURE: 149 MMHG | WEIGHT: 196.63 LBS | HEIGHT: 73 IN | DIASTOLIC BLOOD PRESSURE: 80 MMHG | TEMPERATURE: 97 F

## 2020-07-23 DIAGNOSIS — I10 ESSENTIAL HYPERTENSION: Primary | ICD-10-CM

## 2020-07-23 PROCEDURE — 99213 OFFICE O/P EST LOW 20 MIN: CPT | Performed by: INTERNAL MEDICINE

## 2020-07-23 NOTE — PROGRESS NOTES
Progress Note:      Patient is a 78 yrs old male with pmh of HTN x 30 yrs, hiatal hernia, spinal stenosis, anxiety who presented for follow up     Lab tests showed BUN/Cr 7/1.03 mg/dl with an eGFR 85 ml/min. Serum potassium, albumin and calcium wnl.  Derik Cameron Oral Cap 1 capsule daily     • magnesium oxide 400 MG Oral Tab Take 400 mg by mouth daily. • Multiple Vitamin Oral Tab Take 1 tablet by mouth. • Ascorbic Acid (VITAMIN C) 1000 MG Oral Tab Take 1,000 mg by mouth daily.      • Na Sulfate-K Sulfate-M alert and responsive   Head/Face: normocephalic  Eyes/Vision: normal extraocular motion is intact  Nose/Mouth/Throat:mucous membranes are moist   Neck/Thyroid: neck is supple without adenopathy  Lymphatic: no abnormal cervical, supraclavicular adenopathy i

## 2020-08-28 RX ORDER — NIACIN 500 MG/1
500 TABLET, EXTENDED RELEASE ORAL NIGHTLY
Qty: 90 TABLET | Refills: 3 | Status: SHIPPED | OUTPATIENT
Start: 2020-08-28 | End: 2022-03-29

## 2020-09-02 ENCOUNTER — TELEPHONE (OUTPATIENT)
Dept: GASTROENTEROLOGY | Facility: CLINIC | Age: 70
End: 2020-09-02

## 2020-09-02 NOTE — TELEPHONE ENCOUNTER
Dr Ojeda Dys:    Han Huerta:    I spoke to the pt and let him know I sent his bowel prep to [facility].    Pt is uncomfortable with having the colonoscopy. He is concerned his COVID test can have a false/positive result.  He states COVID tests are only 20-30% accurate

## 2020-09-03 NOTE — TELEPHONE ENCOUNTER
Agree with advice given. Maybe Selwyn Sims would be reassured to know that we (doctors and nurses) are all screened with temperature checks every day and testing if we have any symptoms/ whenever we want.     All patients coming in for procedures are getting

## 2020-09-03 NOTE — TELEPHONE ENCOUNTER
Patient contacted and message from Dr. Remedios Mancia given. Patient voiced understanding and will keep his procedure appointment.

## 2020-09-05 ENCOUNTER — APPOINTMENT (OUTPATIENT)
Dept: LAB | Facility: HOSPITAL | Age: 70
End: 2020-09-05
Attending: INTERNAL MEDICINE
Payer: MEDICARE

## 2020-09-05 DIAGNOSIS — Z01.818 PRE-OP TESTING: ICD-10-CM

## 2020-09-07 LAB — SARS-COV-2 RNA RESP QL NAA+PROBE: NOT DETECTED

## 2020-09-08 ENCOUNTER — ANESTHESIA EVENT (OUTPATIENT)
Dept: ENDOSCOPY | Facility: HOSPITAL | Age: 70
End: 2020-09-08
Payer: MEDICARE

## 2020-09-08 ENCOUNTER — ANESTHESIA (OUTPATIENT)
Dept: ENDOSCOPY | Facility: HOSPITAL | Age: 70
End: 2020-09-08
Payer: MEDICARE

## 2020-09-08 ENCOUNTER — HOSPITAL ENCOUNTER (OUTPATIENT)
Facility: HOSPITAL | Age: 70
Setting detail: HOSPITAL OUTPATIENT SURGERY
Discharge: HOME | End: 2020-09-08
Attending: INTERNAL MEDICINE | Admitting: INTERNAL MEDICINE
Payer: MEDICARE

## 2020-09-08 VITALS
HEART RATE: 52 BPM | DIASTOLIC BLOOD PRESSURE: 75 MMHG | WEIGHT: 192 LBS | SYSTOLIC BLOOD PRESSURE: 126 MMHG | TEMPERATURE: 98 F | OXYGEN SATURATION: 99 % | RESPIRATION RATE: 13 BRPM | HEIGHT: 73 IN | BODY MASS INDEX: 25.45 KG/M2

## 2020-09-08 DIAGNOSIS — K64.9 HEMORRHOIDS: ICD-10-CM

## 2020-09-08 DIAGNOSIS — Z12.11 COLON CANCER SCREENING: ICD-10-CM

## 2020-09-08 DIAGNOSIS — Z01.818 PRE-OP TESTING: Primary | ICD-10-CM

## 2020-09-08 PROCEDURE — G0121 COLON CA SCRN NOT HI RSK IND: HCPCS | Performed by: INTERNAL MEDICINE

## 2020-09-08 PROCEDURE — 0DJD8ZZ INSPECTION OF LOWER INTESTINAL TRACT, VIA NATURAL OR ARTIFICIAL OPENING ENDOSCOPIC: ICD-10-PCS | Performed by: INTERNAL MEDICINE

## 2020-09-08 RX ORDER — NALOXONE HYDROCHLORIDE 0.4 MG/ML
80 INJECTION, SOLUTION INTRAMUSCULAR; INTRAVENOUS; SUBCUTANEOUS AS NEEDED
Status: DISCONTINUED | OUTPATIENT
Start: 2020-09-08 | End: 2020-09-08

## 2020-09-08 RX ORDER — LIDOCAINE HYDROCHLORIDE 20 MG/ML
INJECTION, SOLUTION EPIDURAL; INFILTRATION; INTRACAUDAL; PERINEURAL AS NEEDED
Status: DISCONTINUED | OUTPATIENT
Start: 2020-09-08 | End: 2020-09-08 | Stop reason: SURG

## 2020-09-08 RX ORDER — SODIUM CHLORIDE, SODIUM LACTATE, POTASSIUM CHLORIDE, CALCIUM CHLORIDE 600; 310; 30; 20 MG/100ML; MG/100ML; MG/100ML; MG/100ML
INJECTION, SOLUTION INTRAVENOUS CONTINUOUS
Status: DISCONTINUED | OUTPATIENT
Start: 2020-09-08 | End: 2020-09-08

## 2020-09-08 RX ADMIN — LIDOCAINE HYDROCHLORIDE 40 MG: 20 INJECTION, SOLUTION EPIDURAL; INFILTRATION; INTRACAUDAL; PERINEURAL at 13:02:00

## 2020-09-08 RX ADMIN — SODIUM CHLORIDE, SODIUM LACTATE, POTASSIUM CHLORIDE, CALCIUM CHLORIDE: 600; 310; 30; 20 INJECTION, SOLUTION INTRAVENOUS at 13:35:00

## 2020-09-08 NOTE — OPERATIVE REPORT
COLONOSCOPY PROCEDURE REPORT     DATE OF PROCEDURE:  9/8/2020     PCP: Constantino DE LA ROSA     PREOPERATIVE DIAGNOSIS: Screening colonoscopy examination     POSTOPERATIVE DIAGNOSIS:  See impression. SURGEON:  SERVANDO Nicole

## 2020-09-08 NOTE — ANESTHESIA POSTPROCEDURE EVALUATION
Patient: Scott Pedro    Procedure Summary     Date:  09/08/20 Room / Location:  [facility] ENDOSCOPY 05 / [facility] ENDOSCOPY    Anesthesia Start:  5612 Anesthesia Stop:      Procedure:  COLONOSCOPY (N/A ) Diagnosis:       Colon cancer screening      (hemorrhoids)

## 2020-09-08 NOTE — H&P
History & Physical Examination    Patient Name: Jw Pruitt  MRN: [id number]  CSN: [id number]  YOB: 1950    Diagnosis: Screening colonoscopy examination    Present Illness: Fit and healthy 39-year-old gentleman with history of hypertension Text             Annotation: penicillins, Cerner Reaction: rash  Atorvastatin            OTHER (SEE COMMENTS), NAUSEA AND                            VOMITING    Comment:myalgias             myalgias  Gluten Meal             NAUSEA AND VOMITING    Past Medi

## 2020-09-08 NOTE — ANESTHESIA PREPROCEDURE EVALUATION
Anesthesia PreOp Note    HPI:     Portia Canavan is a 78year old male who presents for preoperative consultation requested by: Delma Angelucci, MD    Date of Surgery: 9/8/2020    Procedure(s):  COLONOSCOPY  Indication: Colon cancer screening    R OR), Take by mouth., Disp: , Rfl: , Not Taking  Milk Thistle 1000 MG Oral Cap, daily. , Disp: , Rfl: , Not Taking      No current Epic-ordered facility-administered medications on file. No current Epic-ordered outpatient medications on file.         Alexandre Hernandez clubs or organizations: Not on file        Relationship status: Not on file      Intimate partner violence:        Fear of current or ex partner: Not on file        Emotionally abused: Not on file        Physically abused: Not on file        Forced sexual

## 2020-12-08 ENCOUNTER — OFFICE VISIT (OUTPATIENT)
Dept: CARDIOLOGY | Age: 70
End: 2020-12-08

## 2020-12-08 VITALS
BODY MASS INDEX: 26.77 KG/M2 | WEIGHT: 202 LBS | HEIGHT: 73 IN | DIASTOLIC BLOOD PRESSURE: 80 MMHG | HEART RATE: 68 BPM | SYSTOLIC BLOOD PRESSURE: 140 MMHG

## 2020-12-08 DIAGNOSIS — I65.23 BILATERAL CAROTID ARTERY STENOSIS: ICD-10-CM

## 2020-12-08 DIAGNOSIS — I25.10 CAD IN NATIVE ARTERY: Primary | ICD-10-CM

## 2020-12-08 DIAGNOSIS — E78.00 PURE HYPERCHOLESTEROLEMIA: ICD-10-CM

## 2020-12-08 DIAGNOSIS — I10 ELEVATED BLOOD PRESSURE READING IN OFFICE WITH WHITE COAT SYNDROME, WITH DIAGNOSIS OF HYPERTENSION: ICD-10-CM

## 2020-12-08 PROCEDURE — 99214 OFFICE O/P EST MOD 30 MIN: CPT | Performed by: INTERNAL MEDICINE

## 2020-12-08 PROCEDURE — 3079F DIAST BP 80-89 MM HG: CPT | Performed by: INTERNAL MEDICINE

## 2020-12-08 PROCEDURE — 3077F SYST BP >= 140 MM HG: CPT | Performed by: INTERNAL MEDICINE

## 2020-12-08 SDOH — HEALTH STABILITY: PHYSICAL HEALTH: ON AVERAGE, HOW MANY DAYS PER WEEK DO YOU ENGAGE IN MODERATE TO STRENUOUS EXERCISE (LIKE A BRISK WALK)?: 0 DAYS

## 2020-12-08 SDOH — HEALTH STABILITY: MENTAL HEALTH: HOW OFTEN DO YOU HAVE A DRINK CONTAINING ALCOHOL?: NEVER

## 2020-12-08 SDOH — HEALTH STABILITY: PHYSICAL HEALTH: ON AVERAGE, HOW MANY MINUTES DO YOU ENGAGE IN EXERCISE AT THIS LEVEL?: 0 MIN

## 2020-12-08 ASSESSMENT — ENCOUNTER SYMPTOMS
FOCAL WEAKNESS: 1
WEIGHT GAIN: 0
FEVER: 0
HEMATOCHEZIA: 0
WEIGHT LOSS: 0
COUGH: 0
ALLERGIC/IMMUNOLOGIC COMMENTS: NO NEW FOOD ALLERGIES
SUSPICIOUS LESIONS: 0
BRUISES/BLEEDS EASILY: 0
HEMOPTYSIS: 0
BACK PAIN: 1
LOSS OF BALANCE: 1
CHILLS: 0
EYES NEGATIVE: 1

## 2020-12-08 ASSESSMENT — PATIENT HEALTH QUESTIONNAIRE - PHQ9
2. FEELING DOWN, DEPRESSED OR HOPELESS: NOT AT ALL
SUM OF ALL RESPONSES TO PHQ9 QUESTIONS 1 AND 2: 0
1. LITTLE INTEREST OR PLEASURE IN DOING THINGS: NOT AT ALL
CLINICAL INTERPRETATION OF PHQ2 SCORE: NO FURTHER SCREENING NEEDED
CLINICAL INTERPRETATION OF PHQ9 SCORE: NO FURTHER SCREENING NEEDED
SUM OF ALL RESPONSES TO PHQ9 QUESTIONS 1 AND 2: 0

## 2020-12-09 ENCOUNTER — TELEPHONE (OUTPATIENT)
Dept: NEUROSURGERY | Age: 70
End: 2020-12-09

## 2020-12-11 ENCOUNTER — TELEPHONE (OUTPATIENT)
Dept: CARDIOLOGY | Age: 70
End: 2020-12-11

## 2020-12-11 ENCOUNTER — OFFICE VISIT (OUTPATIENT)
Dept: NEUROSURGERY | Age: 70
End: 2020-12-11

## 2020-12-11 ENCOUNTER — IMAGING SERVICES (OUTPATIENT)
Dept: GENERAL RADIOLOGY | Age: 70
End: 2020-12-11

## 2020-12-11 VITALS
SYSTOLIC BLOOD PRESSURE: 158 MMHG | RESPIRATION RATE: 16 BRPM | HEART RATE: 60 BPM | DIASTOLIC BLOOD PRESSURE: 93 MMHG | BODY MASS INDEX: 25.18 KG/M2 | WEIGHT: 190 LBS | HEIGHT: 73 IN

## 2020-12-11 DIAGNOSIS — M48.062 SPINAL STENOSIS OF LUMBAR REGION WITH NEUROGENIC CLAUDICATION: ICD-10-CM

## 2020-12-11 DIAGNOSIS — M48.062 SPINAL STENOSIS OF LUMBAR REGION WITH NEUROGENIC CLAUDICATION: Primary | ICD-10-CM

## 2020-12-11 PROCEDURE — 3080F DIAST BP >= 90 MM HG: CPT | Performed by: PHYSICIAN ASSISTANT

## 2020-12-11 PROCEDURE — 3077F SYST BP >= 140 MM HG: CPT | Performed by: PHYSICIAN ASSISTANT

## 2020-12-11 PROCEDURE — 72110 X-RAY EXAM L-2 SPINE 4/>VWS: CPT | Performed by: PHYSICIAN ASSISTANT

## 2020-12-11 PROCEDURE — 99202 OFFICE O/P NEW SF 15 MIN: CPT | Performed by: PHYSICIAN ASSISTANT

## 2020-12-11 SDOH — HEALTH STABILITY: MENTAL HEALTH: HOW OFTEN DO YOU HAVE A DRINK CONTAINING ALCOHOL?: NEVER

## 2020-12-11 ASSESSMENT — ENCOUNTER SYMPTOMS
SEIZURES: 0
HEADACHES: 0
CONSTITUTIONAL NEGATIVE: 1
LIGHT-HEADEDNESS: 0
NUMBNESS: 0
TREMORS: 0
FACIAL ASYMMETRY: 0
BACK PAIN: 1
WEAKNESS: 0
PSYCHIATRIC NEGATIVE: 1
SPEECH DIFFICULTY: 0
DIZZINESS: 0

## 2020-12-11 ASSESSMENT — VISUAL ACUITY: VA_NORMAL: 1

## 2020-12-15 ENCOUNTER — TELEPHONE (OUTPATIENT)
Dept: NEUROSURGERY | Age: 70
End: 2020-12-15

## 2020-12-28 ENCOUNTER — IMAGING SERVICES (OUTPATIENT)
Dept: OTHER | Age: 70
End: 2020-12-28
Attending: PHYSICIAN ASSISTANT

## 2020-12-28 ENCOUNTER — IMAGING SERVICES (OUTPATIENT)
Dept: OTHER | Age: 70
End: 2020-12-28

## 2021-01-04 ENCOUNTER — TELEPHONE (OUTPATIENT)
Dept: GASTROENTEROLOGY | Facility: CLINIC | Age: 71
End: 2021-01-04

## 2021-01-04 NOTE — TELEPHONE ENCOUNTER
Spoke to patient states had labs done at [facility] and states his GFR was at 71. States will be dropping off lab results today or later this week so Dr. Emelia Stevenson can review and advise.  States has had lower back pain unsure if dueto GFR or due to hi

## 2021-01-04 NOTE — TELEPHONE ENCOUNTER
Pt completed labs in December and GFR was 71 . Pt states he has questions regarding results. Pt states he has pain in his lower back.   Please call [phone]

## 2021-01-14 ENCOUNTER — TELEPHONE (OUTPATIENT)
Dept: NEUROSURGERY | Age: 71
End: 2021-01-14

## 2021-01-14 NOTE — TELEPHONE ENCOUNTER
Patientcontacted. Results message reviewed. Patient has concerns about his lab results and wants to keep the appointment on 1/21/21 to discuss with Dr. Aren Hay.

## 2021-01-21 ENCOUNTER — TELEPHONE (OUTPATIENT)
Dept: NEPHROLOGY | Facility: CLINIC | Age: 71
End: 2021-01-21

## 2021-01-21 NOTE — TELEPHONE ENCOUNTER
Patient indicates he had a miss call from our office or message left. His appointment was cancelled for tomorrow. Patient asking to speak with nurse, please call [phone].

## 2021-01-26 ENCOUNTER — TELEPHONE (OUTPATIENT)
Dept: NEUROSURGERY | Age: 71
End: 2021-01-26

## 2021-02-01 ENCOUNTER — OFFICE VISIT (OUTPATIENT)
Dept: NEUROSURGERY | Age: 71
End: 2021-02-01

## 2021-02-01 ENCOUNTER — PREP FOR CASE (OUTPATIENT)
Dept: NEUROSURGERY | Age: 71
End: 2021-02-01

## 2021-02-01 VITALS
RESPIRATION RATE: 18 BRPM | HEART RATE: 64 BPM | BODY MASS INDEX: 25.18 KG/M2 | SYSTOLIC BLOOD PRESSURE: 174 MMHG | DIASTOLIC BLOOD PRESSURE: 101 MMHG | HEIGHT: 73 IN | WEIGHT: 190 LBS

## 2021-02-01 DIAGNOSIS — M54.16 LUMBAR RADICULOPATHY: Primary | ICD-10-CM

## 2021-02-01 PROCEDURE — 3077F SYST BP >= 140 MM HG: CPT | Performed by: NEUROLOGICAL SURGERY

## 2021-02-01 PROCEDURE — 3080F DIAST BP >= 90 MM HG: CPT | Performed by: NEUROLOGICAL SURGERY

## 2021-02-01 PROCEDURE — 99213 OFFICE O/P EST LOW 20 MIN: CPT | Performed by: NEUROLOGICAL SURGERY

## 2021-04-14 LAB
25(OH)D3+25(OH)D2 SERPL-MCNC: 52 NG/ML
ALBUMIN SERPL-MCNC: 4.7 G/DL
ALP SERPL-CCNC: 56 U/L
ALT SERPL-CCNC: 30 UNITS/L
AST SERPL-CCNC: 31 UNITS/L
BILIRUB SERPL-MCNC: 0.7 MG/DL
BUN SERPL-MCNC: 14 MG/DL
CALCIUM SERPL-MCNC: 9.6 MG/DL
CHLORIDE SERPL-SCNC: 102 MMOL/L
CHOLEST SERPL-MCNC: 209 MG/DL
CREAT SERPL-MCNC: 0.97 MG/DL
GLUCOSE SERPL-MCNC: 95 MG/DL
HBA1C MFR BLD: 5.5 %
HDLC SERPL-MCNC: 75 MG/DL
LDLC SERPL CALC-MCNC: 123 MG/DL
MAGNESIUM SERPL-MCNC: 2.1 MG/DL
NONHDLC SERPL-MCNC: 134 MG/DL
POTASSIUM SERPL-SCNC: 3.8 MMOL/L
PROT SERPL-MCNC: 7 G/DL
SODIUM SERPL-SCNC: 138 MMOL/L
T4 FREE SERPL-MCNC: 0.9 NG/DL
TRIGL SERPL-MCNC: 71 MG/DL
TSH SERPL-ACNC: 3.27 MCUNITS/ML

## 2021-04-15 LAB
HCT VFR BLD CALC: 36.9 %
HGB BLD-MCNC: 12.4 G/DL
MCH RBC QN AUTO: 30.1 PG
MCHC RBC AUTO-ENTMCNC: 33.6 G/DL
MCV RBC AUTO: 89.6 FL
PLATELET # BLD: 180 K/MCL
RBC # BLD: 4.12 10*6/UL
WBC # BLD: 4 K/MCL

## 2021-06-03 LAB
HCT VFR BLD CALC: 40.2 %
HGB BLD-MCNC: 13 G/DL
MCH RBC QN AUTO: 29.7 PG
MCHC RBC AUTO-ENTMCNC: 32.3 G/DL
MCV RBC AUTO: 92 FL
PLATELET # BLD: 179 K/MCL
RBC # BLD: 4.37 10*6/UL
WBC # BLD: 4.1 K/MCL

## 2021-06-08 ENCOUNTER — ANCILLARY PROCEDURE (OUTPATIENT)
Dept: CARDIOLOGY | Age: 71
End: 2021-06-08
Attending: INTERNAL MEDICINE

## 2021-06-08 DIAGNOSIS — I25.10 CAD IN NATIVE ARTERY: ICD-10-CM

## 2021-06-08 DIAGNOSIS — I65.23 BILATERAL CAROTID ARTERY STENOSIS: ICD-10-CM

## 2021-06-08 DIAGNOSIS — E78.00 PURE HYPERCHOLESTEROLEMIA: ICD-10-CM

## 2021-06-08 DIAGNOSIS — I10 ELEVATED BLOOD PRESSURE READING IN OFFICE WITH WHITE COAT SYNDROME, WITH DIAGNOSIS OF HYPERTENSION: ICD-10-CM

## 2021-06-08 PROCEDURE — 93880 EXTRACRANIAL BILAT STUDY: CPT | Performed by: INTERNAL MEDICINE

## 2021-06-09 ENCOUNTER — TELEPHONE (OUTPATIENT)
Dept: GASTROENTEROLOGY | Facility: CLINIC | Age: 71
End: 2021-06-09

## 2021-06-09 DIAGNOSIS — K82.4 POLYP OF GALLBLADDER: Primary | ICD-10-CM

## 2021-06-09 NOTE — TELEPHONE ENCOUNTER
Patient outreach message received. Patient due for repeat imaging. \" repeat the ultrasound exam in 1-2 years, per Dr Katelyn Burt letter 6/28/2020. \"

## 2021-06-18 PROBLEM — I10 ESSENTIAL HYPERTENSION: Status: ACTIVE | Noted: 2021-06-18

## 2021-06-21 ENCOUNTER — HOSPITAL ENCOUNTER (OUTPATIENT)
Dept: ULTRASOUND IMAGING | Facility: HOSPITAL | Age: 71
Discharge: HOME | End: 2021-06-21
Attending: INTERNAL MEDICINE
Payer: MEDICARE

## 2021-06-21 DIAGNOSIS — K82.4 POLYP OF GALLBLADDER: ICD-10-CM

## 2021-06-21 PROCEDURE — 76705 ECHO EXAM OF ABDOMEN: CPT | Performed by: INTERNAL MEDICINE

## 2021-06-22 ENCOUNTER — OFFICE VISIT (OUTPATIENT)
Dept: CARDIOLOGY | Age: 71
End: 2021-06-22

## 2021-06-22 VITALS
SYSTOLIC BLOOD PRESSURE: 158 MMHG | RESPIRATION RATE: 20 BRPM | HEART RATE: 56 BPM | BODY MASS INDEX: 25.07 KG/M2 | HEIGHT: 73 IN | DIASTOLIC BLOOD PRESSURE: 78 MMHG

## 2021-06-22 DIAGNOSIS — I10 ESSENTIAL HYPERTENSION: ICD-10-CM

## 2021-06-22 DIAGNOSIS — I25.10 CAD IN NATIVE ARTERY: Primary | ICD-10-CM

## 2021-06-22 DIAGNOSIS — I65.23 CAROTID ARTERY STENOSIS, ASYMPTOMATIC, BILATERAL: ICD-10-CM

## 2021-06-22 DIAGNOSIS — E78.00 PURE HYPERCHOLESTEROLEMIA: ICD-10-CM

## 2021-06-22 PROCEDURE — 99214 OFFICE O/P EST MOD 30 MIN: CPT | Performed by: INTERNAL MEDICINE

## 2021-06-22 RX ORDER — HYDROCHLOROTHIAZIDE 12.5 MG/1
12.5 TABLET ORAL DAILY
COMMUNITY
Start: 2021-05-21 | End: 2023-04-04 | Stop reason: ALTCHOICE

## 2021-06-22 RX ORDER — LOSARTAN POTASSIUM 100 MG/1
100 TABLET ORAL DAILY
COMMUNITY
Start: 2021-06-18 | End: 2023-04-04 | Stop reason: ALTCHOICE

## 2021-06-23 ENCOUNTER — LAB SERVICES (OUTPATIENT)
Dept: CARDIOLOGY | Age: 71
End: 2021-06-23

## 2021-06-26 ENCOUNTER — HOSPITAL ENCOUNTER (EMERGENCY)
Facility: HOSPITAL | Age: 71
Discharge: HOME | End: 2021-06-26
Attending: EMERGENCY MEDICINE
Payer: MEDICARE

## 2021-06-26 VITALS
WEIGHT: 190 LBS | RESPIRATION RATE: 18 BRPM | OXYGEN SATURATION: 99 % | TEMPERATURE: 97 F | SYSTOLIC BLOOD PRESSURE: 165 MMHG | HEART RATE: 53 BPM | BODY MASS INDEX: 25 KG/M2 | DIASTOLIC BLOOD PRESSURE: 89 MMHG

## 2021-06-26 DIAGNOSIS — K20.90 ESOPHAGITIS: Primary | ICD-10-CM

## 2021-06-26 PROCEDURE — 99284 EMERGENCY DEPT VISIT MOD MDM: CPT

## 2021-06-26 PROCEDURE — 93005 ELECTROCARDIOGRAM TRACING: CPT

## 2021-06-26 PROCEDURE — 93010 ELECTROCARDIOGRAM REPORT: CPT | Performed by: EMERGENCY MEDICINE

## 2021-06-27 NOTE — ED INITIAL ASSESSMENT (HPI)
PT states around 2pm today he started to feel like his food is going all the way down to his stomach so he stopped eating. Tried to eat again later and having the same symptoms so he came into the ED to get checked out.

## 2021-06-27 NOTE — ED PROVIDER NOTES
Patient Seen in: [facility] AND [facility] Emergency Department      History   Patient presents with:  Swallowing Problem    Stated Complaint: \"feels food gets stuck after swllowing\"    HPI/Subjective:   HPI    18-year-old male presents for evaluation of diffi Physical Exam  Vitals and nursing note reviewed. Constitutional:       General: He is not in acute distress. Appearance: He is well-developed. HENT:      Head: Normocephalic and atraumatic.    Eyes:      Conjunctiva/sclera: Conjunctivae norm care with a primary care provider within the next three months to obtain basic health screening including reassessment of your blood pressure.       Medications Prescribed:  Discharge Medication List as of 6/26/2021 10:18 PM    START taking these medication

## 2021-06-28 ENCOUNTER — TELEPHONE (OUTPATIENT)
Dept: GASTROENTEROLOGY | Facility: CLINIC | Age: 71
End: 2021-06-28

## 2021-06-28 NOTE — TELEPHONE ENCOUNTER
Thanks all. Certainly okay to start that anti acid treatment. That is like taking Tums or Maalox. Barry Brito is not taking any anti acid medication. He is generally against the idea of prescription medications. However, if he is willing he should take omeprazole or pantoprazole for at least 2-4 weeks to heal the esophagus inflammation. I could prescribe him either of those medications or he could  a 14-day box of Prilosec OTC.     Please offer, add him on for OV on Monday, 7/12/2021

## 2021-06-28 NOTE — TELEPHONE ENCOUNTER
Dr. Rema Fleming    Patient had dysphagia and chest pain on Saturday and went to the emergency department. Reports EKG done in ED for the constant pressure by sternum and was told everything was fine with his heart. He was told he had esophagitis and to follow up with his Gi Doctor right away. He was given a prescription for maalox/diphenhydramine/sucralfate oral suspension, but per patient was told not to take it until he consulted with you. Reports swallowing ok right now but has been sticking with softer foods like eggs. Please advise when patient may be added to your schedule.     Thank you

## 2021-06-29 NOTE — TELEPHONE ENCOUNTER
Patient contacted, verified and message from DR. Spence given. Patient states he does not want to start taking any anti acid medication. Patient will wait till he sees Dr. Nikko Ragland on 2021 at 11:30 am.  Patient advised to ome 15 minutes early and address given. Patient voiced understanding. FYI sent to Dr. Nikko Ragland.

## 2021-07-07 ENCOUNTER — TELEPHONE (OUTPATIENT)
Dept: GASTROENTEROLOGY | Facility: CLINIC | Age: 71
End: 2021-07-07

## 2021-07-07 NOTE — TELEPHONE ENCOUNTER
----- Message from Denia Henry MD sent at 7/4/2021 11:11 AM CDT -----  GI RNs, please recall for repeat ultrasound of gallbladder in 2 years. Upcoming office visit 7/12/2021.

## 2021-07-12 ENCOUNTER — TELEPHONE (OUTPATIENT)
Dept: GASTROENTEROLOGY | Facility: CLINIC | Age: 71
End: 2021-07-12

## 2021-07-12 ENCOUNTER — OFFICE VISIT (OUTPATIENT)
Dept: GASTROENTEROLOGY | Facility: CLINIC | Age: 71
End: 2021-07-12
Payer: MEDICARE

## 2021-07-12 VITALS
BODY MASS INDEX: 25.45 KG/M2 | HEIGHT: 73 IN | WEIGHT: 192 LBS | HEART RATE: 71 BPM | SYSTOLIC BLOOD PRESSURE: 161 MMHG | DIASTOLIC BLOOD PRESSURE: 83 MMHG

## 2021-07-12 DIAGNOSIS — R07.89 ATYPICAL CHEST PAIN: ICD-10-CM

## 2021-07-12 DIAGNOSIS — R13.10 ODYNOPHAGIA: ICD-10-CM

## 2021-07-12 DIAGNOSIS — Z86.19 HISTORY OF HELICOBACTER PYLORI INFECTION: Primary | ICD-10-CM

## 2021-07-12 DIAGNOSIS — R13.10 DYSPHAGIA, UNSPECIFIED TYPE: ICD-10-CM

## 2021-07-12 DIAGNOSIS — R13.10 DYSPHAGIA, UNSPECIFIED TYPE: Primary | ICD-10-CM

## 2021-07-12 DIAGNOSIS — K82.4 GALLBLADDER POLYP: ICD-10-CM

## 2021-07-12 PROCEDURE — 99214 OFFICE O/P EST MOD 30 MIN: CPT | Performed by: INTERNAL MEDICINE

## 2021-07-12 PROCEDURE — 3008F BODY MASS INDEX DOCD: CPT | Performed by: INTERNAL MEDICINE

## 2021-07-12 PROCEDURE — 3077F SYST BP >= 140 MM HG: CPT | Performed by: INTERNAL MEDICINE

## 2021-07-12 PROCEDURE — 3079F DIAST BP 80-89 MM HG: CPT | Performed by: INTERNAL MEDICINE

## 2021-07-12 NOTE — TELEPHONE ENCOUNTER
Scheduled for: EGD 30911 w/possible Dilation  Provider Name: Dr Tomas Dodge   Date: Caryl Kenny 9/09/21   Location: [facility]   Sedation: MAC   Time: 2 pm, (pt is aware that [facility] will call the day before to confirm arrival time)  Prep: Nothing after midnight the day before

## 2021-07-12 NOTE — PROGRESS NOTES
HPI:    Patient ID: Shira Ryan is a 69year old man well-known to me from previous visits and my previous practice out of [facility], also from recent care of his wife.   I recently found a large sessile adenomatous polyp in her which I and a second subsp to the idea of suppressing his body's stomach acid. Brittany Kaiser has multiple questions, including whether the symptoms could relate to his previous hiatal hernia or H. pylori infection.     We also reviewed the gallbladder polyp findings on ultrasounds of 202 examinations was 9923–9218.    ======================  Telephone calls: Me      12/16/19 1:12 PM   Note      Thank you Urvashi Samano for speaking to Mr Giovanna Colon.   Please call him and advise him that this change is from one of the vegetable or fruit regimens or juic his last colonoscopy was over 10 years ago. Colonoscopy recommended at [facility], [address], [address]     12/16/19 10:18 AM   Note      Patient requesting to speak with  regarding gas and fecal output when leaving out gas, happening throughout the day.  Pl Encounters:  07/12/21 : 192 lb (87.1 kg)  06/26/21 : 190 lb (86.2 kg)  12/16/20 : 190 lb (86.2 kg)  09/08/20 : 192 lb (87.1 kg)  07/23/20 : 196 lb 9.6 oz (89.2 kg)  06/26/20 : 194 lb 12.8 oz (88.4 kg)  06/08/20 : 192 lb 3.2 oz (87.2 kg)  12/02/19 : 191 lb 6/1/2020    Indications: Bilateral carotid artery stenosis    Procedure description:  Carotid:  High-resolution cerebrovascular duplex   technique was used to investigate the cerebrovascular systems where   accessible to study in the neck.  Gray scale real minimal bile crystals within the gallbladder.  No definite cholelithiasis.                DICTATED BY (CST): Mignon COPE MD ON 6/18/2020 AT 7:42 AM   =======================    6/21/2021 : US ABDOMEN LIMITED (CPT=76705)       COMPARISON:   [facility] report 1/17/2017 Dr. Edith Shaw:  · Indication: \"Suspected esophageal reflux\"  · Findings: \"Diffuse mild inflammation characterized by erythema was found in the gastric antrum. \"  Biopsies taken. · Normal esophagus and duodenum.   Biopsies taken of the du daily before meals and nightly. 150 mL 0   • tadalafil 5 MG Oral Tab Take 5 mg by mouth daily. • Calcium-Vitamins C & D (CALCIUM/C/D OR) Take 4 tablets by mouth daily. • Acidophilus/Pectin Oral Cap Take 1 capsule by mouth daily.      • hydrochloroth 9004–1208 showing hemoglobin running 12.5g – 13.8g  Lipid profiles with total cholesterol initially 233–240, recently 210  CMP results 8201–8299 showing AST and ALT as high as 58 and 91 on 5/31/2018, recently 52 and 51 on 1/27/2020.    11/24/2018: AST 35 A discussed reflux esophagitis and possible peptic esophageal stricture; hiatal hernia; less likely acute injury such as infection or pill ulceration. I recommended EGD examination and possible dilation to further evaluate. Norbert Olvera was not sure.   We have proceed. The need for an accompanying adult to provide a ride home or escort home was also discussed.       DX = dysphagia, odynophagia, history H. pylori        Prior to this encounter, I spent over 10 minutes preparing for the visit, including reviewing

## 2021-07-13 ENCOUNTER — TELEPHONE (OUTPATIENT)
Dept: GASTROENTEROLOGY | Facility: CLINIC | Age: 71
End: 2021-07-13

## 2021-07-13 DIAGNOSIS — K44.9 HIATAL HERNIA: Primary | ICD-10-CM

## 2021-07-13 NOTE — TELEPHONE ENCOUNTER
Pt saw  yesterday and  told him about physical therapy to strengthen his diaphragm to help with his hiatal hernia. He thought  set him up but she did not and he needs the number to call.  Please call pt

## 2021-07-14 NOTE — TELEPHONE ENCOUNTER
Yes please. Please put in a referral for [facility] for hiatal hernia. Not all physical therapists do this but apparently there are some exercises and therapies for hiatal hernia.

## 2021-07-14 NOTE — TELEPHONE ENCOUNTER
I called the physical tx department and they assisted me with placing the referral    They told me to have the pt call [phone] to schedule outpatient rehabilitative services    I called the pt and gave him the phone number to schedule his physical the

## 2021-07-16 ENCOUNTER — TELEPHONE (OUTPATIENT)
Dept: GASTROENTEROLOGY | Facility: CLINIC | Age: 71
End: 2021-07-16

## 2021-07-16 RX ORDER — SUCRALFATE 1 G/1
TABLET ORAL
Qty: 120 TABLET | Refills: 5 | Status: SHIPPED | OUTPATIENT
Start: 2021-07-16

## 2021-07-16 NOTE — TELEPHONE ENCOUNTER
Patient asking for a prescription for Magic Mouthwash: large tablets of \"Sucralfate\" (Carafate) that you dissolve in water and swallow 3-4 times per day to be sent to [facility] pharmacy in [place]. Patient would like additional refills please. Thank you.

## 2021-07-16 NOTE — TELEPHONE ENCOUNTER
Please calling regarding rx Sucralfate, patient indicates he needs a script, not sold over the counter. Patient is at [facility] now. Please call at [phone],KCPMLW.

## 2021-08-12 ENCOUNTER — TELEPHONE (OUTPATIENT)
Dept: PHYSICAL THERAPY | Facility: HOSPITAL | Age: 71
End: 2021-08-12

## 2021-08-18 ENCOUNTER — OFFICE VISIT (OUTPATIENT)
Dept: PHYSICAL THERAPY | Age: 71
End: 2021-08-18
Attending: INTERNAL MEDICINE
Payer: MEDICARE

## 2021-08-18 DIAGNOSIS — K44.9 HIATAL HERNIA: ICD-10-CM

## 2021-08-18 PROCEDURE — 97110 THERAPEUTIC EXERCISES: CPT | Performed by: PHYSICAL THERAPIST

## 2021-08-18 PROCEDURE — 97162 PT EVAL MOD COMPLEX 30 MIN: CPT | Performed by: PHYSICAL THERAPIST

## 2021-08-19 NOTE — PATIENT INSTRUCTIONS
Patient was instructed in and issued a HEP for pronelying to extension to pronelying x 2-3 mins ea 3-4x/day;  Repeated lumbar extension in pronelying x 10 reps 4-6x/day (every 2-3 hours);   Posture correction in sitting on a supportive/hard chair with

## 2021-08-20 ENCOUNTER — OFFICE VISIT (OUTPATIENT)
Dept: PHYSICAL THERAPY | Age: 71
End: 2021-08-20
Attending: INTERNAL MEDICINE
Payer: MEDICARE

## 2021-08-20 DIAGNOSIS — K44.9 HIATAL HERNIA: ICD-10-CM

## 2021-08-20 PROCEDURE — 97110 THERAPEUTIC EXERCISES: CPT | Performed by: PHYSICAL THERAPIST

## 2021-08-20 NOTE — PROGRESS NOTES
Date: 8/20/2021     Dx:  Hiatal hernia (K44.9)           Authorized # of Visits:  8       Referring MD: Ace Poole  Next MD visit: none scheduled    Medication Changes since last visit?: No    Subjective:  Naomi Ramirez report that he does not feel worse but maybe a abolished symptoms in the low back to enable easier ADLs, functional, work, and recreational activities.    3. Patient to have WNL of lumbar mobility in all directions to facilitate a return to bending down, sitting for any amount of time, rising up from si

## 2021-08-25 ENCOUNTER — OFFICE VISIT (OUTPATIENT)
Dept: PHYSICAL THERAPY | Age: 71
End: 2021-08-25
Attending: INTERNAL MEDICINE
Payer: MEDICARE

## 2021-08-25 PROCEDURE — 97110 THERAPEUTIC EXERCISES: CPT | Performed by: PHYSICAL THERAPIST

## 2021-08-25 NOTE — PROGRESS NOTES
Date: 8/25/2021     Dx:  Hiatal hernia (K44.9)           Authorized # of Visits:  8       Referring MD: Dru Chacon  Next MD visit: none scheduled    Medication Changes since last visit?: No    Subjective:  Jeff Yancey report that he has been doing his back bending flexion/abudction test motion                  Assessment/HEP:   Victorina Leon remained painfree in the back and LEs after his session. He is progressed with sustained endrange extension with belt overpressure.   Added resistance to postural stability exercise on

## 2021-08-27 ENCOUNTER — OFFICE VISIT (OUTPATIENT)
Dept: PHYSICAL THERAPY | Age: 71
End: 2021-08-27
Attending: INTERNAL MEDICINE
Payer: MEDICARE

## 2021-08-27 PROCEDURE — 97110 THERAPEUTIC EXERCISES: CPT | Performed by: PHYSICAL THERAPIST

## 2021-08-27 NOTE — PROGRESS NOTES
Date: 8/27/2021     Dx:  Hiatal hernia (K44.9)           Authorized # of Visits:  8       Referring MD: Ace Poole  Next MD visit: none scheduled    Medication Changes since last visit?: No    Subjective:  Naomi Ramirez report that back still feels weak but no pain. belt OP at pelvis x 3+3 mins (higher angle)    REIL x 10 reps; NE stretch only ((L) shoulder ache)    Prone: Alt LE lift 4 lbs 2 x 10 reps; NE    Prone: (B) UE extension 2 lbs 10 reps x 10 cts ea; NE    Prone: (B) UE 1 lb h.abduction 10 reps x 10 cts ea;  Charm Maurisio directional preference exercise, postural stability exercises, posture correction, patient education, and HEP progression. Charges:  TherEx x 4       Total Timed Treatment: 56 mins Total Treatment Time: 57 mins

## 2021-09-01 ENCOUNTER — OFFICE VISIT (OUTPATIENT)
Dept: PHYSICAL THERAPY | Age: 71
End: 2021-09-01
Attending: INTERNAL MEDICINE
Payer: MEDICARE

## 2021-09-01 PROCEDURE — 97110 THERAPEUTIC EXERCISES: CPT | Performed by: PHYSICAL THERAPIST

## 2021-09-01 NOTE — PROGRESS NOTES
Date: 9/1/2021     Dx:  Hiatal hernia (K44.9)           Authorized # of Visits:  8       Referring MD: Cathrine Cranker  Next MD visit: none scheduled    Medication Changes since last visit?: No    Subjective:  Bandar Arguello report that he would like to do more exercise fo angle)    REIL x 10 reps; NE stretch only ((L) shoulder ache)    Prone:  Alt LE lift 4 lbs 2 x 10 reps; NE    Prone: (B) UE extension 2 lbs 10 reps x 10 cts ea; NE    Prone: (B) UE 1 lb h.abduction 10 reps x 10 cts ea; NE    Prone: (B) UE scaption with 1 lb block, laying down on his back, lift, and carry without producing or increasing symptoms in the back, buttock, and thighs. 4. Patient to consistently have good posture to promote her symptomfree condition.              Plan:   Re-assess next session and co

## 2021-09-03 ENCOUNTER — OFFICE VISIT (OUTPATIENT)
Dept: PHYSICAL THERAPY | Age: 71
End: 2021-09-03
Attending: INTERNAL MEDICINE
Payer: MEDICARE

## 2021-09-03 PROCEDURE — 97110 THERAPEUTIC EXERCISES: CPT | Performed by: PHYSICAL THERAPIST

## 2021-09-03 NOTE — PROGRESS NOTES
Date: 9/3/2021     Dx:  Hiatal hernia (K44.9)           Authorized # of Visits:  8       Referring MD: Garry Reyna  Next MD visit: none scheduled    Medication Changes since last visit?: No    Subjective:  David Flores report that he was sore after his exercises the 10 reps; NE stretch only ((L) shoulder ache)    Prone:  Alt LE lift 4 lbs 2 x 10 reps; NE    Prone: (B) UE extension 2 lbs 10 reps x 10 cts ea; NE    Prone: (B) UE 1 lb h.abduction 10 reps x 10 cts ea; NE    Prone: (B) UE scaption with 1 lb 10 reps x 10 cts last session but to always start and end the exercise with a back bend. Goals:   Goals     • Therapy Goals       (8 visits)  1. Patient to consistently perform her HEP and it's progression to maintain her improved condition.    2. Patient to have reduce

## 2021-09-07 ENCOUNTER — LAB REQUISITION (OUTPATIENT)
Dept: SURGERY | Age: 71
End: 2021-09-07
Payer: MEDICARE

## 2021-09-07 DIAGNOSIS — Z01.818 PREOP EXAMINATION: ICD-10-CM

## 2021-09-07 LAB — SARS-COV-2 RNA RESP QL NAA+PROBE: NOT DETECTED

## 2021-09-08 ENCOUNTER — OFFICE VISIT (OUTPATIENT)
Dept: PHYSICAL THERAPY | Age: 71
End: 2021-09-08
Attending: INTERNAL MEDICINE
Payer: MEDICARE

## 2021-09-08 PROCEDURE — 97110 THERAPEUTIC EXERCISES: CPT | Performed by: PHYSICAL THERAPIST

## 2021-09-08 NOTE — PROGRESS NOTES
Date: 9/7/2021     Dx:  Hiatal hernia (K44.9)           Authorized # of Visits:  8       Referring MD: Sandeep Rain  Next MD visit: none scheduled    Medication Changes since last visit?: No    Subjective:  Marina Castorena report that he was good after his last session. lb 10 reps x 10 cts ea; NE    ELEANOR with hands on wall x 10 reps; NE     (L) shoulder ache    Standing repeated (L) shoulder h.adduction 2 x 10 reps; P, NW (better)    Less tightness/ache at endrange of (L) shoulder flexion/abudction test motion         Sci tired     + (R/L) elbow to opposite knee reach x 15 reps; NE tired     + (R/L) abdominal rotations x 15 reps ea; NE tired    (B) UE blueTB n.row 10 reps x 10 cts ea; NE    ELEANOR with hands on wall x 10 reps; NE      Scifit UE only L9 fwd/bkwd x 5 mins ea;  Dale Avila increasing symptoms in the back, buttock, and thighs. 4. Patient to consistently have good posture to promote her symptomfree condition.            Plan:   Re-assess next session and continue with extension directional preference exercise, postural stabili

## 2021-09-09 ENCOUNTER — LAB REQUISITION (OUTPATIENT)
Dept: SURGERY | Age: 71
End: 2021-09-09
Payer: MEDICARE

## 2021-09-09 ENCOUNTER — SURGERY CENTER DOCUMENTATION (OUTPATIENT)
Dept: SURGERY | Age: 71
End: 2021-09-09

## 2021-09-09 DIAGNOSIS — R13.10 DYSPHAGIA, UNSPECIFIED TYPE: ICD-10-CM

## 2021-09-09 PROCEDURE — 88305 TISSUE EXAM BY PATHOLOGIST: CPT | Performed by: INTERNAL MEDICINE

## 2021-09-09 PROCEDURE — 88312 SPECIAL STAINS GROUP 1: CPT | Performed by: INTERNAL MEDICINE

## 2021-09-09 NOTE — PROCEDURES
[facility]      EGD PROCEDURE REPORT    DATE OF PROCEDURE:  9/9/2021    PCP: Gamaliel DE LA ROSA     PREOPERATIVE DIAGNOSIS: GERD with dysphagia     POSTOPERATIVE DIAGNOSIS:  See impression. SURGEON:  Moshe Green duodenum. IMPRESSION:  · Mild reflux esophagitis changes seen in this patient with recent severe dysphagia complaints, GERD symptoms, taking only occasional sucralfate for the GERD symptoms.   Trial of 20mm balloon dilation performed as above with minima

## 2021-09-16 ENCOUNTER — OFFICE VISIT (OUTPATIENT)
Dept: PHYSICAL THERAPY | Age: 71
End: 2021-09-16
Attending: INTERNAL MEDICINE
Payer: MEDICARE

## 2021-09-16 PROCEDURE — 97110 THERAPEUTIC EXERCISES: CPT | Performed by: PHYSICAL THERAPIST

## 2021-09-16 NOTE — PROGRESS NOTES
Date: 9/16/2021     Dx:  Hiatal hernia (K44.9)           Authorized # of Visits:  8       Referring MD: Servando Gomez  Next MD visit: none scheduled    Medication Changes since last visit?: No    Subjective:  Yasir Murrell report that he does not have any pain at this t fwd/bkwd x 5 mins ea; NE tired    ELEANOR over rail x 10 reps; NE    Prone: sustained extension with belt OP at pelvis x 3+3 mins (higher angle)    REIL x 10 reps; NE stretch only ((L) shoulder ache)    Prone:  Alt LE lift 4 lbs 2 x 10 reps; NE    Prone: (B) U tired    Pronelying x 1 min; NE unload back    REIL sag x 10 (8+2) reps; NE    Prone:  Alt LE lift 3 lbs x 10 reps; NE    Prone: (B) UE extension 3 lbs 10 reps x 10 cts ea; NE    Prone: (B) UE 2 lb h.abduction 10 reps x 10 cts ea; NE    Prone: (B) UE scapti abolished symptoms in the low back to enable easier ADLs, functional, work, and recreational activities.    3. Patient to have WNL of lumbar mobility in all directions to facilitate a return to bending down, sitting for any amount of time, rising up from si

## 2021-10-05 ENCOUNTER — TELEPHONE (OUTPATIENT)
Dept: GASTROENTEROLOGY | Facility: CLINIC | Age: 71
End: 2021-10-05

## 2021-10-05 NOTE — TELEPHONE ENCOUNTER
Results letter mailed to patient per .   No recall for Colonoscopy and Tarik Renteria MD  P Em Gi Clinical Staff  GI RNs - [facility] print and mail this letter to patient

## 2021-10-14 ENCOUNTER — APPOINTMENT (OUTPATIENT)
Dept: PHYSICAL THERAPY | Age: 71
End: 2021-10-14
Attending: INTERNAL MEDICINE
Payer: MEDICARE

## 2021-10-15 ENCOUNTER — TELEPHONE (OUTPATIENT)
Dept: PHYSICAL THERAPY | Age: 71
End: 2021-10-15

## 2021-10-21 ENCOUNTER — APPOINTMENT (OUTPATIENT)
Dept: PHYSICAL THERAPY | Age: 71
End: 2021-10-21
Attending: INTERNAL MEDICINE
Payer: MEDICARE

## 2021-10-28 ENCOUNTER — APPOINTMENT (OUTPATIENT)
Dept: PHYSICAL THERAPY | Age: 71
End: 2021-10-28
Attending: INTERNAL MEDICINE
Payer: MEDICARE

## 2021-11-02 ENCOUNTER — APPOINTMENT (OUTPATIENT)
Dept: PHYSICAL THERAPY | Age: 71
End: 2021-11-02
Attending: INTERNAL MEDICINE
Payer: MEDICARE

## 2021-11-04 ENCOUNTER — APPOINTMENT (OUTPATIENT)
Dept: PHYSICAL THERAPY | Age: 71
End: 2021-11-04
Attending: INTERNAL MEDICINE
Payer: MEDICARE

## 2021-11-09 ENCOUNTER — APPOINTMENT (OUTPATIENT)
Dept: PHYSICAL THERAPY | Age: 71
End: 2021-11-09
Attending: INTERNAL MEDICINE
Payer: MEDICARE

## 2021-11-11 ENCOUNTER — APPOINTMENT (OUTPATIENT)
Dept: PHYSICAL THERAPY | Age: 71
End: 2021-11-11
Attending: INTERNAL MEDICINE
Payer: MEDICARE

## 2021-11-16 ENCOUNTER — APPOINTMENT (OUTPATIENT)
Dept: PHYSICAL THERAPY | Age: 71
End: 2021-11-16
Attending: INTERNAL MEDICINE
Payer: MEDICARE

## 2021-11-18 ENCOUNTER — APPOINTMENT (OUTPATIENT)
Dept: PHYSICAL THERAPY | Age: 71
End: 2021-11-18
Attending: INTERNAL MEDICINE
Payer: MEDICARE

## 2021-11-23 ENCOUNTER — APPOINTMENT (OUTPATIENT)
Dept: PHYSICAL THERAPY | Age: 71
End: 2021-11-23
Attending: INTERNAL MEDICINE
Payer: MEDICARE

## 2021-11-24 ENCOUNTER — TELEPHONE (OUTPATIENT)
Dept: GASTROENTEROLOGY | Facility: CLINIC | Age: 71
End: 2021-11-24

## 2021-11-24 DIAGNOSIS — Z86.19 HISTORY OF HELICOBACTER PYLORI INFECTION: ICD-10-CM

## 2021-11-24 DIAGNOSIS — R07.89 ATYPICAL CHEST PAIN: ICD-10-CM

## 2021-11-24 DIAGNOSIS — A04.8 H. PYLORI INFECTION: Primary | ICD-10-CM

## 2021-11-24 NOTE — TELEPHONE ENCOUNTER
Patient states he is experiencing \"uneasy feeling in stomach area\" for several days. Denies burning, cramping, or hunger-like pain in the stomach area, abdominal pain,nausea,vomiting or bloody stools. Patient states feels like H.Pylori is coming back.

## 2021-11-24 NOTE — TELEPHONE ENCOUNTER
Thanks [facility]. No H. pylori seen on reassuring gastric biopsies of 9/9/2021. That was just 2 months ago. I suspect that this is upset stomach, stress maybe anxiety from the holidays.   Please advise Adrienne Felix that I would advise continuing his stomach re

## 2021-11-24 NOTE — TELEPHONE ENCOUNTER
Patient contacted and message from Dr. Tomas Dodge given. Patient states he would  like to have H. Pylori Test done. Order pended. Please advise. Thank you.

## 2021-11-26 NOTE — TELEPHONE ENCOUNTER
Attempted to reach patient at home # but had busy tone x2. Spoke to patient's wife Elena Hassan who states mobile# is hers and patient is currently at home. She verified home# with me. I will try again at the home#.

## 2021-11-26 NOTE — TELEPHONE ENCOUNTER
Left message for Marianela to have patient call back due to busy tone at home #. Principal Discharge DX:	Afib

## 2021-11-26 NOTE — TELEPHONE ENCOUNTER
Patient returned call, I informed him of message below from Dr. Tootie Naranjo. Patient will call lab/central scheduling to see if appointment is needed or can walk in.

## 2021-11-29 ENCOUNTER — LAB ENCOUNTER (OUTPATIENT)
Dept: LAB | Facility: HOSPITAL | Age: 71
End: 2021-11-29
Attending: INTERNAL MEDICINE
Payer: MEDICARE

## 2021-11-29 PROCEDURE — 83013 H PYLORI (C-13) BREATH: CPT | Performed by: INTERNAL MEDICINE

## 2021-11-30 ENCOUNTER — APPOINTMENT (OUTPATIENT)
Dept: PHYSICAL THERAPY | Age: 71
End: 2021-11-30
Attending: INTERNAL MEDICINE
Payer: MEDICARE

## 2021-12-02 ENCOUNTER — APPOINTMENT (OUTPATIENT)
Dept: PHYSICAL THERAPY | Age: 71
End: 2021-12-02
Attending: INTERNAL MEDICINE
Payer: MEDICARE

## 2021-12-03 ENCOUNTER — TELEPHONE (OUTPATIENT)
Dept: GASTROENTEROLOGY | Facility: CLINIC | Age: 71
End: 2021-12-03

## 2021-12-03 DIAGNOSIS — R10.13 EPIGASTRIC ABDOMINAL PAIN: Primary | ICD-10-CM

## 2021-12-03 DIAGNOSIS — R10.33 PERIUMBILICAL ABDOMINAL PAIN: ICD-10-CM

## 2021-12-03 DIAGNOSIS — R10.13 DYSPEPSIA: ICD-10-CM

## 2021-12-07 ENCOUNTER — APPOINTMENT (OUTPATIENT)
Dept: PHYSICAL THERAPY | Age: 71
End: 2021-12-07
Attending: INTERNAL MEDICINE
Payer: MEDICARE

## 2021-12-09 ENCOUNTER — APPOINTMENT (OUTPATIENT)
Dept: PHYSICAL THERAPY | Age: 71
End: 2021-12-09
Attending: INTERNAL MEDICINE
Payer: MEDICARE

## 2021-12-14 ENCOUNTER — APPOINTMENT (OUTPATIENT)
Dept: PHYSICAL THERAPY | Age: 71
End: 2021-12-14
Attending: INTERNAL MEDICINE
Payer: MEDICARE

## 2021-12-16 ENCOUNTER — APPOINTMENT (OUTPATIENT)
Dept: PHYSICAL THERAPY | Age: 71
End: 2021-12-16
Attending: INTERNAL MEDICINE
Payer: MEDICARE

## 2021-12-21 ENCOUNTER — HOSPITAL ENCOUNTER (OUTPATIENT)
Dept: CT IMAGING | Facility: HOSPITAL | Age: 71
Discharge: HOME | End: 2021-12-21
Attending: INTERNAL MEDICINE
Payer: MEDICARE

## 2021-12-21 ENCOUNTER — APPOINTMENT (OUTPATIENT)
Dept: PHYSICAL THERAPY | Age: 71
End: 2021-12-21
Attending: INTERNAL MEDICINE
Payer: MEDICARE

## 2021-12-21 DIAGNOSIS — R10.13 EPIGASTRIC ABDOMINAL PAIN: ICD-10-CM

## 2021-12-21 DIAGNOSIS — R10.33 PERIUMBILICAL ABDOMINAL PAIN: ICD-10-CM

## 2021-12-21 DIAGNOSIS — R10.13 DYSPEPSIA: ICD-10-CM

## 2021-12-21 PROCEDURE — 74177 CT ABD & PELVIS W/CONTRAST: CPT | Performed by: INTERNAL MEDICINE

## 2021-12-21 PROCEDURE — 82565 ASSAY OF CREATININE: CPT

## 2022-01-01 ENCOUNTER — EXTERNAL RECORD (OUTPATIENT)
Dept: OTHER | Age: 72
End: 2022-01-01

## 2022-01-10 ENCOUNTER — TELEPHONE (OUTPATIENT)
Dept: GASTROENTEROLOGY | Facility: CLINIC | Age: 72
End: 2022-01-10

## 2022-01-11 NOTE — TELEPHONE ENCOUNTER
THANKS SAMMY!!! Caroline Saucedo and GI RNs,    Please call Kika Juan Alberto Ekaterina to advise that the recent CT scan abdomen and pelvis with IV contrast of 12/22/2021 was very good news. There was no cause for abdominal pain identified on his CT scan.     Specific findings:  ·

## 2022-01-11 NOTE — TELEPHONE ENCOUNTER
I spoke to the pt and went over Dr Spence's recommendations and f/u instructions.     He voices understanding but is wondering why he is still experiencing GERD symptoms    He does not take Prilosec or sucralfate to help his symptoms nor is he interested in

## 2022-03-22 LAB
25(OH)D3+25(OH)D2 SERPL-MCNC: 51 NG/ML (ref 30–80)
ALBUMIN SERPL-MCNC: 4.7 GM/DL (ref 3.6–5)
ALP SERPL-CCNC: 59 U/L (ref 30–110)
ALT SERPL-CCNC: 33 U/L (ref 10–40)
ANION GAP SERPL CALC-SCNC: 8 MMOL/L (ref 4–16)
AST SERPL-CCNC: 38 U/L (ref 15–45)
BILIRUB SERPL-MCNC: 0.6 MG/DL (ref 0.2–1.4)
BUN SERPL-MCNC: 21 MG/DL (ref 7–22)
CALCIUM SERPL-MCNC: 9.8 MG/DL (ref 8.5–10.5)
CHLORIDE SERPL-SCNC: 100 MMOL/L (ref 98–108)
CHOLEST SERPL-MCNC: 237 MG/DL
CK SERPL-CCNC: 836 U/L (ref 50–320)
CO2 SERPL-SCNC: 30 MMOL/L (ref 20–32)
CREAT SERPL-MCNC: 1.2 MG/DL (ref 0.6–1.4)
CRP SERPL HS-MCNC: 1.3 MG/L
ERYTHROCYTE [DISTWIDTH] IN BLOOD: 13.4 % (ref 11–15)
EST. AVERAGE GLUCOSE BLD GHB EST-MCNC: 114 MG/DL
GLUCOSE SERPL-MCNC: 108 MG/DL (ref 70–100)
HBA1C MFR BLD: 5.6 % (ref 4–6)
HCT VFR BLD CALC: 38.3 % (ref 38–54)
HDLC SERPL-MCNC: 83 MG/DL
HGB BLD-MCNC: 12.8 GM/DL (ref 13–17.5)
IRON SATN MFR SERPL: 22 % (ref 20–50)
IRON SERPL-MCNC: 85 UG/DL (ref 40–150)
LDLC SERPL CALC-MCNC: 145 MG/DL
LENGTH OF FAST TIME PATIENT: ABNORMAL H
LENGTH OF FAST TIME PATIENT: ABNORMAL H
MAGNESIUM SERPL-MCNC: 1.8 MG/DL (ref 1.8–2.5)
MCH RBC QN AUTO: 30.4 PG (ref 27–34)
MCHC RBC AUTO-ENTMCNC: 33.4 GM/DL (ref 32–36)
MCV RBC AUTO: 91 FL (ref 82–99)
MPV (OFFPRE2): 11.7 FL (ref 9–13)
NONHDLC SERPL-MCNC: 154 MG/DL
PLATELET # BLD: 145 K/UL (ref 150–400)
POTASSIUM SERPL-SCNC: 4.3 MMOL/L (ref 3.3–5.1)
PROT SERPL-MCNC: 7 GM/DL (ref 6.5–8.3)
RBC # BLD: 4.21 M/UL (ref 4.2–5.8)
SODIUM SERPL-SCNC: 138 MMOL/L (ref 136–144)
T3FREE SERPL-MCNC: 247 PG/DL (ref 171–371)
T4 FREE SERPL-MCNC: 0.9 NG/DL (ref 0.7–1.5)
TIBC SERPL-MCNC: 384 UG/DL (ref 260–480)
TRANSFERRIN SERPL-MCNC: 274 MG/DL (ref 180–329)
TRIGL SERPL-MCNC: 56 MG/DL
TSH SERPL-ACNC: 3.29 UIU/ML (ref 0.46–5.05)
URATE SERPL-MCNC: 5.3 MG/DL (ref 4.4–7.4)
VIT B12 SERPL-MCNC: 951 PG/ML (ref 180–914)
WBC # BLD: 2.9 K/UL (ref 3.5–10.5)

## 2022-03-24 ENCOUNTER — OFFICE VISIT (OUTPATIENT)
Dept: CARDIOLOGY | Age: 72
End: 2022-03-24

## 2022-03-24 VITALS
HEART RATE: 70 BPM | WEIGHT: 188 LBS | BODY MASS INDEX: 24.8 KG/M2 | SYSTOLIC BLOOD PRESSURE: 148 MMHG | DIASTOLIC BLOOD PRESSURE: 80 MMHG

## 2022-03-24 DIAGNOSIS — I25.10 CAD IN NATIVE ARTERY: Primary | ICD-10-CM

## 2022-03-24 DIAGNOSIS — E78.00 PURE HYPERCHOLESTEROLEMIA: ICD-10-CM

## 2022-03-24 DIAGNOSIS — I65.23 CAROTID ARTERY STENOSIS, ASYMPTOMATIC, BILATERAL: ICD-10-CM

## 2022-03-24 DIAGNOSIS — I10 ESSENTIAL HYPERTENSION: ICD-10-CM

## 2022-03-24 PROCEDURE — 3077F SYST BP >= 140 MM HG: CPT | Performed by: INTERNAL MEDICINE

## 2022-03-24 PROCEDURE — 99214 OFFICE O/P EST MOD 30 MIN: CPT | Performed by: INTERNAL MEDICINE

## 2022-03-24 PROCEDURE — 3079F DIAST BP 80-89 MM HG: CPT | Performed by: INTERNAL MEDICINE

## 2022-03-29 RX ORDER — NIACIN 500 MG/1
TABLET, EXTENDED RELEASE ORAL
Qty: 90 TABLET | Refills: 1 | Status: SHIPPED | OUTPATIENT
Start: 2022-03-29

## 2022-05-12 ENCOUNTER — OFFICE VISIT (OUTPATIENT)
Dept: NEPHROLOGY | Facility: CLINIC | Age: 72
End: 2022-05-12
Payer: MEDICARE

## 2022-05-12 VITALS
WEIGHT: 184 LBS | DIASTOLIC BLOOD PRESSURE: 62 MMHG | BODY MASS INDEX: 24.39 KG/M2 | HEIGHT: 73 IN | SYSTOLIC BLOOD PRESSURE: 132 MMHG | HEART RATE: 79 BPM

## 2022-05-12 DIAGNOSIS — D61.818 PANCYTOPENIA (HCC): ICD-10-CM

## 2022-05-12 DIAGNOSIS — N18.2 CHRONIC KIDNEY DISEASE (CKD), STAGE II (MILD): Primary | ICD-10-CM

## 2022-05-12 PROCEDURE — 3075F SYST BP GE 130 - 139MM HG: CPT | Performed by: INTERNAL MEDICINE

## 2022-05-12 PROCEDURE — 99215 OFFICE O/P EST HI 40 MIN: CPT | Performed by: INTERNAL MEDICINE

## 2022-05-12 PROCEDURE — 3008F BODY MASS INDEX DOCD: CPT | Performed by: INTERNAL MEDICINE

## 2022-05-12 PROCEDURE — 3078F DIAST BP <80 MM HG: CPT | Performed by: INTERNAL MEDICINE

## 2022-06-13 ENCOUNTER — LAB ENCOUNTER (OUTPATIENT)
Dept: LAB | Facility: HOSPITAL | Age: 72
End: 2022-06-13
Attending: INTERNAL MEDICINE
Payer: MEDICARE

## 2022-06-13 DIAGNOSIS — N18.2 CHRONIC KIDNEY DISEASE (CKD), STAGE II (MILD): ICD-10-CM

## 2022-06-13 LAB
ANION GAP SERPL CALC-SCNC: 5 MMOL/L (ref 0–18)
BASOPHILS # BLD AUTO: 0.01 X10(3) UL (ref 0–0.2)
BASOPHILS NFR BLD AUTO: 0.2 %
BILIRUB UR QL: NEGATIVE
BUN BLD-MCNC: 14 MG/DL (ref 7–18)
BUN/CREAT SERPL: 11.1 (ref 10–20)
CALCIUM BLD-MCNC: 9.5 MG/DL (ref 8.5–10.1)
CHLORIDE SERPL-SCNC: 102 MMOL/L (ref 98–112)
CLARITY UR: CLEAR
CO2 SERPL-SCNC: 31 MMOL/L (ref 21–32)
COLOR UR: YELLOW
CREAT BLD-MCNC: 1.26 MG/DL
CREAT UR-SCNC: 128 MG/DL
DEPRECATED RDW RBC AUTO: 44.9 FL (ref 35.1–46.3)
EOSINOPHIL # BLD AUTO: 0.11 X10(3) UL (ref 0–0.7)
EOSINOPHIL NFR BLD AUTO: 2.2 %
ERYTHROCYTE [DISTWIDTH] IN BLOOD BY AUTOMATED COUNT: 13.2 % (ref 11–15)
FASTING STATUS PATIENT QL REPORTED: NO
GLUCOSE BLD-MCNC: 113 MG/DL (ref 70–99)
GLUCOSE UR-MCNC: NEGATIVE MG/DL
HCT VFR BLD AUTO: 39 %
HGB BLD-MCNC: 12.6 G/DL
HGB UR QL STRIP.AUTO: NEGATIVE
IMM GRANULOCYTES # BLD AUTO: 0.01 X10(3) UL (ref 0–1)
IMM GRANULOCYTES NFR BLD: 0.2 %
KETONES UR-MCNC: NEGATIVE MG/DL
LEUKOCYTE ESTERASE UR QL STRIP.AUTO: NEGATIVE
LYMPHOCYTES # BLD AUTO: 2.25 X10(3) UL (ref 1–4)
LYMPHOCYTES NFR BLD AUTO: 45.1 %
MCH RBC QN AUTO: 30.1 PG (ref 26–34)
MCHC RBC AUTO-ENTMCNC: 32.3 G/DL (ref 31–37)
MCV RBC AUTO: 93.1 FL
MONOCYTES # BLD AUTO: 0.66 X10(3) UL (ref 0.1–1)
MONOCYTES NFR BLD AUTO: 13.2 %
NEUTROPHILS # BLD AUTO: 1.95 X10 (3) UL (ref 1.5–7.7)
NEUTROPHILS # BLD AUTO: 1.95 X10(3) UL (ref 1.5–7.7)
NEUTROPHILS NFR BLD AUTO: 39.1 %
NITRITE UR QL STRIP.AUTO: NEGATIVE
OSMOLALITY SERPL CALC.SUM OF ELEC: 287 MOSM/KG (ref 275–295)
PH UR: 7 [PH] (ref 5–8)
PLATELET # BLD AUTO: 180 10(3)UL (ref 150–450)
POTASSIUM SERPL-SCNC: 3.8 MMOL/L (ref 3.5–5.1)
PROT UR-MCNC: 8.6 MG/DL
PROT UR-MCNC: NEGATIVE MG/DL
PROT/CREAT UR-RTO: 0.07
RBC # BLD AUTO: 4.19 X10(6)UL
SODIUM SERPL-SCNC: 138 MMOL/L (ref 136–145)
SP GR UR STRIP: 1.01 (ref 1–1.03)
UROBILINOGEN UR STRIP-ACNC: 0.2
WBC # BLD AUTO: 5 X10(3) UL (ref 4–11)

## 2022-06-13 PROCEDURE — 82570 ASSAY OF URINE CREATININE: CPT

## 2022-06-13 PROCEDURE — 36415 COLL VENOUS BLD VENIPUNCTURE: CPT

## 2022-06-13 PROCEDURE — 81003 URINALYSIS AUTO W/O SCOPE: CPT

## 2022-06-13 PROCEDURE — 85025 COMPLETE CBC W/AUTO DIFF WBC: CPT

## 2022-06-13 PROCEDURE — 80048 BASIC METABOLIC PNL TOTAL CA: CPT

## 2022-06-13 PROCEDURE — 84156 ASSAY OF PROTEIN URINE: CPT

## 2022-06-21 ENCOUNTER — OFFICE VISIT (OUTPATIENT)
Dept: NEPHROLOGY | Facility: CLINIC | Age: 72
End: 2022-06-21
Payer: MEDICARE

## 2022-06-21 VITALS
WEIGHT: 184.5 LBS | SYSTOLIC BLOOD PRESSURE: 148 MMHG | DIASTOLIC BLOOD PRESSURE: 80 MMHG | HEART RATE: 61 BPM | BODY MASS INDEX: 24 KG/M2

## 2022-06-21 DIAGNOSIS — N18.2 CHRONIC KIDNEY DISEASE (CKD), STAGE II (MILD): Primary | ICD-10-CM

## 2022-06-21 PROCEDURE — 1126F AMNT PAIN NOTED NONE PRSNT: CPT | Performed by: INTERNAL MEDICINE

## 2022-06-21 PROCEDURE — 3079F DIAST BP 80-89 MM HG: CPT | Performed by: INTERNAL MEDICINE

## 2022-06-21 PROCEDURE — 3077F SYST BP >= 140 MM HG: CPT | Performed by: INTERNAL MEDICINE

## 2022-06-21 PROCEDURE — 99214 OFFICE O/P EST MOD 30 MIN: CPT | Performed by: INTERNAL MEDICINE

## 2022-06-30 ENCOUNTER — TELEPHONE (OUTPATIENT)
Dept: NEPHROLOGY | Facility: CLINIC | Age: 72
End: 2022-06-30

## 2022-06-30 NOTE — TELEPHONE ENCOUNTER
Pt is calling and states he is having bad pain that comes and goes but if he pushes it, it is very bad. Audelia Gr

## 2022-06-30 NOTE — TELEPHONE ENCOUNTER
Dr. Santiago Blanco, patient reports pain in right side and it comes and goes. It's been there for months but is most recently very painful when he pushes on his right side, midriff area. Denies other symptoms, urinating fine, denies nausea/vomiting. Wondering if it's possibly related to kidneys or having kidney stone?  Also advised patient to contact PCP

## 2022-07-27 ENCOUNTER — LAB ENCOUNTER (OUTPATIENT)
Dept: LAB | Facility: HOSPITAL | Age: 72
End: 2022-07-27
Attending: UROLOGY
Payer: MEDICARE

## 2022-07-27 DIAGNOSIS — Z12.5 SPECIAL SCREENING FOR MALIGNANT NEOPLASM OF PROSTATE: Primary | ICD-10-CM

## 2022-07-27 LAB — COMPLEXED PSA SERPL-MCNC: 2.19 NG/ML (ref ?–4)

## 2022-07-27 PROCEDURE — 36415 COLL VENOUS BLD VENIPUNCTURE: CPT

## 2022-09-15 ENCOUNTER — LAB ENCOUNTER (OUTPATIENT)
Dept: LAB | Facility: HOSPITAL | Age: 72
End: 2022-09-15
Attending: INTERNAL MEDICINE
Payer: MEDICARE

## 2022-09-15 DIAGNOSIS — N18.2 CHRONIC KIDNEY DISEASE (CKD), STAGE II (MILD): ICD-10-CM

## 2022-09-15 LAB
ALBUMIN SERPL-MCNC: 4.1 G/DL (ref 3.4–5)
ANION GAP SERPL CALC-SCNC: 7 MMOL/L (ref 0–18)
BASOPHILS # BLD AUTO: 0.02 X10(3) UL (ref 0–0.2)
BASOPHILS NFR BLD AUTO: 0.5 %
BUN BLD-MCNC: 10 MG/DL (ref 7–18)
BUN/CREAT SERPL: 9.8 (ref 10–20)
CALCIUM BLD-MCNC: 9.3 MG/DL (ref 8.5–10.1)
CHLORIDE SERPL-SCNC: 102 MMOL/L (ref 98–112)
CO2 SERPL-SCNC: 30 MMOL/L (ref 21–32)
CREAT BLD-MCNC: 1.02 MG/DL
DEPRECATED RDW RBC AUTO: 42.8 FL (ref 35.1–46.3)
EOSINOPHIL # BLD AUTO: 0.09 X10(3) UL (ref 0–0.7)
EOSINOPHIL NFR BLD AUTO: 2.4 %
ERYTHROCYTE [DISTWIDTH] IN BLOOD BY AUTOMATED COUNT: 12.5 % (ref 11–15)
GFR SERPLBLD BASED ON 1.73 SQ M-ARVRAT: 78 ML/MIN/1.73M2 (ref 60–?)
GLUCOSE BLD-MCNC: 100 MG/DL (ref 70–99)
HCT VFR BLD AUTO: 38.2 %
HGB BLD-MCNC: 12.6 G/DL
IMM GRANULOCYTES # BLD AUTO: 0.01 X10(3) UL (ref 0–1)
IMM GRANULOCYTES NFR BLD: 0.3 %
LYMPHOCYTES # BLD AUTO: 1.76 X10(3) UL (ref 1–4)
LYMPHOCYTES NFR BLD AUTO: 46.2 %
MCH RBC QN AUTO: 30.6 PG (ref 26–34)
MCHC RBC AUTO-ENTMCNC: 33 G/DL (ref 31–37)
MCV RBC AUTO: 92.7 FL
MONOCYTES # BLD AUTO: 0.53 X10(3) UL (ref 0.1–1)
MONOCYTES NFR BLD AUTO: 13.9 %
NEUTROPHILS # BLD AUTO: 1.4 X10 (3) UL (ref 1.5–7.7)
NEUTROPHILS # BLD AUTO: 1.4 X10(3) UL (ref 1.5–7.7)
NEUTROPHILS NFR BLD AUTO: 36.7 %
OSMOLALITY SERPL CALC.SUM OF ELEC: 287 MOSM/KG (ref 275–295)
PHOSPHATE SERPL-MCNC: 2.7 MG/DL (ref 2.5–4.9)
PLATELET # BLD AUTO: 179 10(3)UL (ref 150–450)
POTASSIUM SERPL-SCNC: 4.1 MMOL/L (ref 3.5–5.1)
RBC # BLD AUTO: 4.12 X10(6)UL
SODIUM SERPL-SCNC: 139 MMOL/L (ref 136–145)
WBC # BLD AUTO: 3.8 X10(3) UL (ref 4–11)

## 2022-09-15 PROCEDURE — 80069 RENAL FUNCTION PANEL: CPT

## 2022-09-15 PROCEDURE — 85025 COMPLETE CBC W/AUTO DIFF WBC: CPT

## 2022-09-15 PROCEDURE — 36415 COLL VENOUS BLD VENIPUNCTURE: CPT

## 2022-09-22 ENCOUNTER — OFFICE VISIT (OUTPATIENT)
Dept: NEPHROLOGY | Facility: CLINIC | Age: 72
End: 2022-09-22

## 2022-09-22 VITALS
HEIGHT: 73 IN | BODY MASS INDEX: 23.72 KG/M2 | RESPIRATION RATE: 16 BRPM | HEART RATE: 64 BPM | WEIGHT: 179 LBS | DIASTOLIC BLOOD PRESSURE: 69 MMHG | SYSTOLIC BLOOD PRESSURE: 144 MMHG

## 2022-09-22 DIAGNOSIS — N18.2 CHRONIC KIDNEY DISEASE (CKD), STAGE II (MILD): Primary | ICD-10-CM

## 2022-09-22 PROCEDURE — 99214 OFFICE O/P EST MOD 30 MIN: CPT | Performed by: INTERNAL MEDICINE

## 2022-09-22 PROCEDURE — 3077F SYST BP >= 140 MM HG: CPT | Performed by: INTERNAL MEDICINE

## 2022-09-22 PROCEDURE — 3078F DIAST BP <80 MM HG: CPT | Performed by: INTERNAL MEDICINE

## 2022-09-22 PROCEDURE — 3008F BODY MASS INDEX DOCD: CPT | Performed by: INTERNAL MEDICINE

## 2022-09-26 ENCOUNTER — LAB SERVICES (OUTPATIENT)
Dept: LAB | Age: 72
End: 2022-09-26

## 2022-09-26 DIAGNOSIS — I10 ESSENTIAL HYPERTENSION: ICD-10-CM

## 2022-09-26 DIAGNOSIS — I65.23 CAROTID ARTERY STENOSIS, ASYMPTOMATIC, BILATERAL: ICD-10-CM

## 2022-09-26 DIAGNOSIS — I25.10 CAD IN NATIVE ARTERY: ICD-10-CM

## 2022-09-26 DIAGNOSIS — E78.00 PURE HYPERCHOLESTEROLEMIA: ICD-10-CM

## 2022-09-26 LAB
ALBUMIN SERPL-MCNC: 4.1 G/DL (ref 3.6–5.1)
ALBUMIN/GLOB SERPL: 1.4 {RATIO} (ref 1–2.4)
ALP SERPL-CCNC: 60 UNITS/L (ref 45–117)
ALT SERPL-CCNC: 39 UNITS/L
ANION GAP SERPL CALC-SCNC: 10 MMOL/L (ref 7–19)
AST SERPL-CCNC: 29 UNITS/L
BASOPHILS # BLD: 0 K/MCL (ref 0–0.3)
BASOPHILS NFR BLD: 1 %
BILIRUB SERPL-MCNC: 0.4 MG/DL (ref 0.2–1)
BUN SERPL-MCNC: 13 MG/DL (ref 6–20)
BUN/CREAT SERPL: 13 (ref 7–25)
CALCIUM SERPL-MCNC: 9.4 MG/DL (ref 8.4–10.2)
CHLORIDE SERPL-SCNC: 105 MMOL/L (ref 97–110)
CHOLEST SERPL-MCNC: 221 MG/DL
CHOLEST/HDLC SERPL: 2.3 {RATIO}
CO2 SERPL-SCNC: 30 MMOL/L (ref 21–32)
CREAT SERPL-MCNC: 0.99 MG/DL (ref 0.67–1.17)
DEPRECATED RDW RBC: 44.5 FL (ref 39–50)
EOSINOPHIL # BLD: 0.1 K/MCL (ref 0–0.5)
EOSINOPHIL NFR BLD: 3 %
ERYTHROCYTE [DISTWIDTH] IN BLOOD: 13.1 % (ref 11–15)
FASTING DURATION TIME PATIENT: ABNORMAL H
FASTING DURATION TIME PATIENT: ABNORMAL H
GFR SERPLBLD BASED ON 1.73 SQ M-ARVRAT: 80 ML/MIN
GLOBULIN SER-MCNC: 2.9 G/DL (ref 2–4)
GLUCOSE SERPL-MCNC: 105 MG/DL (ref 70–99)
HCT VFR BLD CALC: 39.9 % (ref 39–51)
HDLC SERPL-MCNC: 95 MG/DL
HGB BLD-MCNC: 13.1 G/DL (ref 13–17)
IMM GRANULOCYTES # BLD AUTO: 0 K/MCL (ref 0–0.2)
IMM GRANULOCYTES # BLD: 0 %
LDLC SERPL CALC-MCNC: 113 MG/DL
LYMPHOCYTES # BLD: 1.9 K/MCL (ref 1–4)
LYMPHOCYTES NFR BLD: 50 %
MCH RBC QN AUTO: 30.3 PG (ref 26–34)
MCHC RBC AUTO-ENTMCNC: 32.8 G/DL (ref 32–36.5)
MCV RBC AUTO: 92.4 FL (ref 78–100)
MONOCYTES # BLD: 0.5 K/MCL (ref 0.3–0.9)
MONOCYTES NFR BLD: 13 %
NEUTROPHILS # BLD: 1.3 K/MCL (ref 1.8–7.7)
NEUTROPHILS NFR BLD: 33 %
NONHDLC SERPL-MCNC: 126 MG/DL
NRBC BLD MANUAL-RTO: 0 /100 WBC
PLATELET # BLD AUTO: 175 K/MCL (ref 140–450)
POTASSIUM SERPL-SCNC: 4.5 MMOL/L (ref 3.4–5.1)
PROT SERPL-MCNC: 7 G/DL (ref 6.4–8.2)
RBC # BLD: 4.32 MIL/MCL (ref 4.5–5.9)
SODIUM SERPL-SCNC: 140 MMOL/L (ref 135–145)
TRIGL SERPL-MCNC: 64 MG/DL
WBC # BLD: 3.8 K/MCL (ref 4.2–11)

## 2022-09-26 PROCEDURE — 85025 COMPLETE CBC W/AUTO DIFF WBC: CPT | Performed by: INTERNAL MEDICINE

## 2022-09-26 PROCEDURE — 80061 LIPID PANEL: CPT | Performed by: INTERNAL MEDICINE

## 2022-09-26 PROCEDURE — 36415 COLL VENOUS BLD VENIPUNCTURE: CPT | Performed by: INTERNAL MEDICINE

## 2022-09-26 PROCEDURE — 80053 COMPREHEN METABOLIC PANEL: CPT | Performed by: INTERNAL MEDICINE

## 2022-09-29 ENCOUNTER — TELEPHONE (OUTPATIENT)
Dept: HEMATOLOGY/ONCOLOGY | Age: 72
End: 2022-09-29

## 2022-09-29 ENCOUNTER — TELEPHONE (OUTPATIENT)
Dept: CARDIOLOGY | Age: 72
End: 2022-09-29

## 2022-09-29 ENCOUNTER — OFFICE VISIT (OUTPATIENT)
Dept: CARDIOLOGY | Age: 72
End: 2022-09-29

## 2022-09-29 VITALS
HEART RATE: 67 BPM | DIASTOLIC BLOOD PRESSURE: 77 MMHG | WEIGHT: 180.45 LBS | BODY MASS INDEX: 23.81 KG/M2 | SYSTOLIC BLOOD PRESSURE: 156 MMHG

## 2022-09-29 DIAGNOSIS — I10 ELEVATED BLOOD PRESSURE READING IN OFFICE WITH WHITE COAT SYNDROME, WITH DIAGNOSIS OF HYPERTENSION: ICD-10-CM

## 2022-09-29 DIAGNOSIS — I10 ESSENTIAL HYPERTENSION: ICD-10-CM

## 2022-09-29 DIAGNOSIS — I25.10 CAD IN NATIVE ARTERY: Primary | ICD-10-CM

## 2022-09-29 DIAGNOSIS — E78.00 PURE HYPERCHOLESTEROLEMIA: ICD-10-CM

## 2022-09-29 DIAGNOSIS — I65.23 CAROTID ARTERY STENOSIS, ASYMPTOMATIC, BILATERAL: ICD-10-CM

## 2022-09-29 DIAGNOSIS — M48.062 SPINAL STENOSIS OF LUMBAR REGION WITH NEUROGENIC CLAUDICATION: ICD-10-CM

## 2022-09-29 PROCEDURE — 3078F DIAST BP <80 MM HG: CPT | Performed by: INTERNAL MEDICINE

## 2022-09-29 PROCEDURE — 99214 OFFICE O/P EST MOD 30 MIN: CPT | Performed by: INTERNAL MEDICINE

## 2022-09-29 PROCEDURE — 3077F SYST BP >= 140 MM HG: CPT | Performed by: INTERNAL MEDICINE

## 2022-09-29 ASSESSMENT — PATIENT HEALTH QUESTIONNAIRE - PHQ9
SUM OF ALL RESPONSES TO PHQ9 QUESTIONS 1 AND 2: 0
2. FEELING DOWN, DEPRESSED OR HOPELESS: NOT AT ALL
CLINICAL INTERPRETATION OF PHQ2 SCORE: NO FURTHER SCREENING NEEDED
SUM OF ALL RESPONSES TO PHQ9 QUESTIONS 1 AND 2: 0
1. LITTLE INTEREST OR PLEASURE IN DOING THINGS: NOT AT ALL

## 2022-10-12 ENCOUNTER — TELEPHONE (OUTPATIENT)
Dept: CARDIOLOGY | Age: 72
End: 2022-10-12

## 2022-10-12 DIAGNOSIS — Z82.49 FAMILY HISTORY OF ABDOMINAL AORTIC ANEURYSM (AAA): Primary | ICD-10-CM

## 2022-10-13 ENCOUNTER — HOSPITAL ENCOUNTER (OUTPATIENT)
Dept: ULTRASOUND IMAGING | Age: 72
Discharge: HOME | End: 2022-10-13
Attending: INTERNAL MEDICINE

## 2022-10-13 DIAGNOSIS — Z82.49 FAMILY HISTORY OF ABDOMINAL AORTIC ANEURYSM (AAA): ICD-10-CM

## 2022-10-13 PROCEDURE — 76706 US ABDL AORTA SCREEN AAA: CPT

## 2022-10-14 ENCOUNTER — TELEPHONE (OUTPATIENT)
Dept: CARDIOLOGY | Age: 72
End: 2022-10-14

## 2022-10-17 RX ORDER — GABAPENTIN 100 MG/1
CAPSULE ORAL
COMMUNITY
Start: 2022-10-12 | End: 2022-10-18 | Stop reason: ALTCHOICE

## 2022-10-18 ENCOUNTER — OFFICE VISIT (OUTPATIENT)
Dept: HEMATOLOGY/ONCOLOGY | Age: 72
End: 2022-10-18
Attending: INTERNAL MEDICINE

## 2022-10-18 ENCOUNTER — HOSPITAL ENCOUNTER (OUTPATIENT)
Dept: LAB | Age: 72
Discharge: STILL ADMITTED OR ADMINISTRATIVE | End: 2022-10-18
Attending: INTERNAL MEDICINE

## 2022-10-18 VITALS
BODY MASS INDEX: 23.86 KG/M2 | DIASTOLIC BLOOD PRESSURE: 74 MMHG | TEMPERATURE: 98.3 F | WEIGHT: 180 LBS | SYSTOLIC BLOOD PRESSURE: 154 MMHG | HEART RATE: 60 BPM | HEIGHT: 73 IN | OXYGEN SATURATION: 100 %

## 2022-10-18 DIAGNOSIS — D72.818 OTHER DECREASED WHITE BLOOD CELL (WBC) COUNT: Primary | ICD-10-CM

## 2022-10-18 DIAGNOSIS — D72.818 OTHER DECREASED WHITE BLOOD CELL (WBC) COUNT: ICD-10-CM

## 2022-10-18 LAB
ALBUMIN SERPL-MCNC: 4.2 G/DL (ref 3.6–5.1)
ALBUMIN/GLOB SERPL: 1.3 {RATIO} (ref 1–2.4)
ALP SERPL-CCNC: 66 UNITS/L (ref 45–117)
ALT SERPL-CCNC: 37 UNITS/L
ANION GAP SERPL CALC-SCNC: 10 MMOL/L (ref 7–19)
ANNOTATION COMMENT IMP: NORMAL
AST SERPL-CCNC: 25 UNITS/L
BASOPHILS # BLD: 0 K/MCL (ref 0–0.3)
BASOPHILS NFR BLD: 1 %
BILIRUB SERPL-MCNC: 0.5 MG/DL (ref 0.2–1)
BUN SERPL-MCNC: 14 MG/DL (ref 6–20)
BUN/CREAT SERPL: 15 (ref 7–25)
CALCIUM SERPL-MCNC: 9.6 MG/DL (ref 8.4–10.2)
CHLORIDE SERPL-SCNC: 106 MMOL/L (ref 97–110)
CO2 SERPL-SCNC: 29 MMOL/L (ref 21–32)
CREAT SERPL-MCNC: 0.96 MG/DL (ref 0.67–1.17)
DEPRECATED RDW RBC: 43.5 FL (ref 39–50)
EOSINOPHIL # BLD: 0.1 K/MCL (ref 0–0.5)
EOSINOPHIL NFR BLD: 2 %
ERYTHROCYTE [DISTWIDTH] IN BLOOD: 13.2 % (ref 11–15)
FASTING DURATION TIME PATIENT: ABNORMAL H
FOLATE SERPL-MCNC: >24 NG/ML
GFR SERPLBLD BASED ON 1.73 SQ M-ARVRAT: 83 ML/MIN
GLOBULIN SER-MCNC: 3.2 G/DL (ref 2–4)
GLUCOSE SERPL-MCNC: 100 MG/DL (ref 70–99)
HBV CORE IGG+IGM SER QL: NEGATIVE
HBV SURFACE AB SER QL: NEGATIVE
HBV SURFACE AG SER QL: NEGATIVE
HCT VFR BLD CALC: 39.1 % (ref 39–51)
HCV AB SER QL: NEGATIVE
HGB BLD-MCNC: 13.2 G/DL (ref 13–17)
HIV 1+2 AB+HIV1 P24 AG SERPL QL IA: NONREACTIVE
IMM GRANULOCYTES # BLD AUTO: 0 K/MCL (ref 0–0.2)
IMM GRANULOCYTES # BLD: 0 %
LYMPHOCYTES # BLD: 1.4 K/MCL (ref 1–4)
LYMPHOCYTES NFR BLD: 42 %
MCH RBC QN AUTO: 30.8 PG (ref 26–34)
MCHC RBC AUTO-ENTMCNC: 33.8 G/DL (ref 32–36.5)
MCV RBC AUTO: 91.4 FL (ref 78–100)
MONOCYTES # BLD: 0.4 K/MCL (ref 0.3–0.9)
MONOCYTES NFR BLD: 12 %
NEUTROPHILS # BLD: 1.4 K/MCL (ref 1.8–7.7)
NEUTROPHILS NFR BLD: 43 %
NRBC BLD MANUAL-RTO: 0 /100 WBC
PLATELET # BLD AUTO: 183 K/MCL (ref 140–450)
POTASSIUM SERPL-SCNC: 3.9 MMOL/L (ref 3.4–5.1)
PROT SERPL-MCNC: 7.4 G/DL (ref 6.4–8.2)
RBC # BLD: 4.28 MIL/MCL (ref 4.5–5.9)
SODIUM SERPL-SCNC: 141 MMOL/L (ref 135–145)
VIT B12 SERPL-MCNC: 1259 PG/ML (ref 211–911)
WBC # BLD: 3.4 K/MCL (ref 4.2–11)

## 2022-10-18 PROCEDURE — 3078F DIAST BP <80 MM HG: CPT | Performed by: INTERNAL MEDICINE

## 2022-10-18 PROCEDURE — 86704 HEP B CORE ANTIBODY TOTAL: CPT | Performed by: INTERNAL MEDICINE

## 2022-10-18 PROCEDURE — 36415 COLL VENOUS BLD VENIPUNCTURE: CPT | Performed by: INTERNAL MEDICINE

## 2022-10-18 PROCEDURE — 82525 ASSAY OF COPPER: CPT | Performed by: INTERNAL MEDICINE

## 2022-10-18 PROCEDURE — 85025 COMPLETE CBC W/AUTO DIFF WBC: CPT | Performed by: INTERNAL MEDICINE

## 2022-10-18 PROCEDURE — 82746 ASSAY OF FOLIC ACID SERUM: CPT | Performed by: INTERNAL MEDICINE

## 2022-10-18 PROCEDURE — 3077F SYST BP >= 140 MM HG: CPT | Performed by: INTERNAL MEDICINE

## 2022-10-18 PROCEDURE — 99205 OFFICE O/P NEW HI 60 MIN: CPT | Performed by: INTERNAL MEDICINE

## 2022-10-18 PROCEDURE — 87389 HIV-1 AG W/HIV-1&-2 AB AG IA: CPT | Performed by: INTERNAL MEDICINE

## 2022-10-18 PROCEDURE — 99202 OFFICE O/P NEW SF 15 MIN: CPT

## 2022-10-18 PROCEDURE — 86665 EPSTEIN-BARR CAPSID VCA: CPT | Performed by: INTERNAL MEDICINE

## 2022-10-18 PROCEDURE — 80053 COMPREHEN METABOLIC PANEL: CPT | Performed by: INTERNAL MEDICINE

## 2022-10-18 PROCEDURE — 86644 CMV ANTIBODY: CPT | Performed by: INTERNAL MEDICINE

## 2022-10-18 ASSESSMENT — ENCOUNTER SYMPTOMS
DIZZINESS: 0
ABDOMINAL DISTENTION: 0
ADENOPATHY: 0
HEADACHES: 0
COUGH: 0
CHOKING: 0
APPETITE CHANGE: 0
BRUISES/BLEEDS EASILY: 0
APNEA: 0
SLEEP DISTURBANCE: 0
CONFUSION: 0
VOICE CHANGE: 0
WEAKNESS: 0
ABDOMINAL PAIN: 0
WHEEZING: 0
CONSTIPATION: 0
FATIGUE: 0
UNEXPECTED WEIGHT CHANGE: 0
LIGHT-HEADEDNESS: 0
BLOOD IN STOOL: 0
NAUSEA: 0
FEVER: 0
CHEST TIGHTNESS: 0
NERVOUS/ANXIOUS: 1
TROUBLE SWALLOWING: 0
BACK PAIN: 0
SHORTNESS OF BREATH: 0
CHILLS: 0
SPEECH DIFFICULTY: 0
DIARRHEA: 0
ACTIVITY CHANGE: 0
VOMITING: 0
DIAPHORESIS: 0

## 2022-10-18 ASSESSMENT — PATIENT HEALTH QUESTIONNAIRE - PHQ9
CLINICAL INTERPRETATION OF PHQ2 SCORE: NO FURTHER SCREENING NEEDED
2. FEELING DOWN, DEPRESSED OR HOPELESS: NOT AT ALL
SUM OF ALL RESPONSES TO PHQ9 QUESTIONS 1 AND 2: 0
1. LITTLE INTEREST OR PLEASURE IN DOING THINGS: NOT AT ALL
SUM OF ALL RESPONSES TO PHQ9 QUESTIONS 1 AND 2: 0

## 2022-10-18 ASSESSMENT — PAIN SCALES - GENERAL: PAINLEVEL: 0

## 2022-10-19 LAB
CMV IGG SERPL IA-ACNC: 0.14 ISR
CMV IGM SERPL IA-ACNC: 0.06 OD RATIO
COPPER SERPL-MCNC: 84 MCG/DL (ref 70–140)
EBV EA IGG SER-ACNC: 4.7 AI
EBV NA IGG SER-ACNC: >8 AI
EBV VCA IGG SER-ACNC: >8 AI
EBV VCA IGM SER-ACNC: 0.6 AI

## 2022-10-24 ENCOUNTER — TELEPHONE (OUTPATIENT)
Dept: HEMATOLOGY/ONCOLOGY | Age: 72
End: 2022-10-24

## 2022-11-29 ENCOUNTER — OFFICE VISIT (OUTPATIENT)
Dept: HEMATOLOGY/ONCOLOGY | Age: 72
End: 2022-11-29
Attending: INTERNAL MEDICINE

## 2022-11-29 ENCOUNTER — HOSPITAL ENCOUNTER (OUTPATIENT)
Dept: LAB | Age: 72
Discharge: STILL ADMITTED OR ADMINISTRATIVE | End: 2022-11-29
Attending: INTERNAL MEDICINE

## 2022-11-29 VITALS
OXYGEN SATURATION: 100 % | WEIGHT: 186 LBS | TEMPERATURE: 98.2 F | HEIGHT: 73 IN | HEART RATE: 61 BPM | BODY MASS INDEX: 24.65 KG/M2 | DIASTOLIC BLOOD PRESSURE: 75 MMHG | SYSTOLIC BLOOD PRESSURE: 175 MMHG

## 2022-11-29 DIAGNOSIS — D72.818 OTHER DECREASED WHITE BLOOD CELL (WBC) COUNT: Primary | ICD-10-CM

## 2022-11-29 DIAGNOSIS — D72.818 OTHER DECREASED WHITE BLOOD CELL (WBC) COUNT: ICD-10-CM

## 2022-11-29 LAB
ALBUMIN SERPL-MCNC: 3.9 G/DL (ref 3.6–5.1)
ALBUMIN/GLOB SERPL: 1.2 {RATIO} (ref 1–2.4)
ALP SERPL-CCNC: 64 UNITS/L (ref 45–117)
ALT SERPL-CCNC: 44 UNITS/L
ANION GAP SERPL CALC-SCNC: 9 MMOL/L (ref 7–19)
AST SERPL-CCNC: 28 UNITS/L
BASOPHILS # BLD: 0 K/MCL (ref 0–0.3)
BASOPHILS NFR BLD: 1 %
BILIRUB SERPL-MCNC: 0.6 MG/DL (ref 0.2–1)
BUN SERPL-MCNC: 18 MG/DL (ref 6–20)
BUN/CREAT SERPL: 19 (ref 7–25)
CALCIUM SERPL-MCNC: 9.2 MG/DL (ref 8.4–10.2)
CHLORIDE SERPL-SCNC: 106 MMOL/L (ref 97–110)
CO2 SERPL-SCNC: 29 MMOL/L (ref 21–32)
CREAT SERPL-MCNC: 0.96 MG/DL (ref 0.67–1.17)
DEPRECATED RDW RBC: 45.1 FL (ref 39–50)
EOSINOPHIL # BLD: 0.1 K/MCL (ref 0–0.5)
EOSINOPHIL NFR BLD: 3 %
ERYTHROCYTE [DISTWIDTH] IN BLOOD: 13.5 % (ref 11–15)
FASTING DURATION TIME PATIENT: ABNORMAL H
FOLATE SERPL-MCNC: >24 NG/ML
GFR SERPLBLD BASED ON 1.73 SQ M-ARVRAT: 83 ML/MIN
GLOBULIN SER-MCNC: 3.3 G/DL (ref 2–4)
GLUCOSE SERPL-MCNC: 131 MG/DL (ref 70–99)
HCT VFR BLD CALC: 39.7 % (ref 39–51)
HGB BLD-MCNC: 13.1 G/DL (ref 13–17)
IMM GRANULOCYTES # BLD AUTO: 0 K/MCL (ref 0–0.2)
IMM GRANULOCYTES # BLD: 0 %
LYMPHOCYTES # BLD: 2 K/MCL (ref 1–4)
LYMPHOCYTES NFR BLD: 43 %
MCH RBC QN AUTO: 30 PG (ref 26–34)
MCHC RBC AUTO-ENTMCNC: 33 G/DL (ref 32–36.5)
MCV RBC AUTO: 91.1 FL (ref 78–100)
MONOCYTES # BLD: 0.5 K/MCL (ref 0.3–0.9)
MONOCYTES NFR BLD: 11 %
NEUTROPHILS # BLD: 1.9 K/MCL (ref 1.8–7.7)
NEUTROPHILS NFR BLD: 42 %
NRBC BLD MANUAL-RTO: 0 /100 WBC
PLATELET # BLD AUTO: 185 K/MCL (ref 140–450)
POTASSIUM SERPL-SCNC: 3.8 MMOL/L (ref 3.4–5.1)
PROT SERPL-MCNC: 7.2 G/DL (ref 6.4–8.2)
RBC # BLD: 4.36 MIL/MCL (ref 4.5–5.9)
SODIUM SERPL-SCNC: 140 MMOL/L (ref 135–145)
VIT B12 SERPL-MCNC: 1592 PG/ML (ref 211–911)
WBC # BLD: 4.5 K/MCL (ref 4.2–11)

## 2022-11-29 PROCEDURE — 86644 CMV ANTIBODY: CPT | Performed by: INTERNAL MEDICINE

## 2022-11-29 PROCEDURE — 80053 COMPREHEN METABOLIC PANEL: CPT | Performed by: INTERNAL MEDICINE

## 2022-11-29 PROCEDURE — 86663 EPSTEIN-BARR ANTIBODY: CPT | Performed by: INTERNAL MEDICINE

## 2022-11-29 PROCEDURE — 3077F SYST BP >= 140 MM HG: CPT | Performed by: INTERNAL MEDICINE

## 2022-11-29 PROCEDURE — 82746 ASSAY OF FOLIC ACID SERUM: CPT | Performed by: INTERNAL MEDICINE

## 2022-11-29 PROCEDURE — 86665 EPSTEIN-BARR CAPSID VCA: CPT | Performed by: INTERNAL MEDICINE

## 2022-11-29 PROCEDURE — 3078F DIAST BP <80 MM HG: CPT | Performed by: INTERNAL MEDICINE

## 2022-11-29 PROCEDURE — 82525 ASSAY OF COPPER: CPT | Performed by: INTERNAL MEDICINE

## 2022-11-29 PROCEDURE — 36415 COLL VENOUS BLD VENIPUNCTURE: CPT | Performed by: INTERNAL MEDICINE

## 2022-11-29 PROCEDURE — 85025 COMPLETE CBC W/AUTO DIFF WBC: CPT | Performed by: INTERNAL MEDICINE

## 2022-11-29 PROCEDURE — 99214 OFFICE O/P EST MOD 30 MIN: CPT | Performed by: INTERNAL MEDICINE

## 2022-11-29 PROCEDURE — 99211 OFF/OP EST MAY X REQ PHY/QHP: CPT

## 2022-11-29 ASSESSMENT — ENCOUNTER SYMPTOMS
LIGHT-HEADEDNESS: 0
SPEECH DIFFICULTY: 0
ADENOPATHY: 0
BRUISES/BLEEDS EASILY: 0
DIAPHORESIS: 0
TROUBLE SWALLOWING: 0
COUGH: 0
FATIGUE: 0
VOICE CHANGE: 0
CONFUSION: 0
BLOOD IN STOOL: 0
VOMITING: 0
SLEEP DISTURBANCE: 0
WEAKNESS: 0
CHEST TIGHTNESS: 0
BACK PAIN: 0
CHILLS: 0
ABDOMINAL DISTENTION: 0
DIZZINESS: 0
ABDOMINAL PAIN: 0
APPETITE CHANGE: 0
CHOKING: 0
FEVER: 0
SHORTNESS OF BREATH: 0
APNEA: 0
HEADACHES: 0
ACTIVITY CHANGE: 0
DIARRHEA: 0
CONSTIPATION: 0
WHEEZING: 0
NAUSEA: 0
UNEXPECTED WEIGHT CHANGE: 0

## 2022-11-29 ASSESSMENT — PAIN SCALES - GENERAL: PAINLEVEL: 0

## 2022-11-30 LAB
CMV IGG SERPL IA-ACNC: 0.16 ISR
CMV IGM SERPL IA-ACNC: 0.05 OD RATIO
COPPER SERPL-MCNC: 83 MCG/DL (ref 70–140)
EBV EA IGG SER-ACNC: 4.7 AI
EBV NA IGG SER-ACNC: >8 AI
EBV VCA IGG SER-ACNC: >8 AI
EBV VCA IGM SER-ACNC: 0.6 AI

## 2023-02-01 ENCOUNTER — OFFICE VISIT (OUTPATIENT)
Dept: NEUROLOGY | Facility: CLINIC | Age: 73
End: 2023-02-01
Payer: MEDICARE

## 2023-02-01 VITALS
WEIGHT: 179 LBS | BODY MASS INDEX: 23.72 KG/M2 | HEIGHT: 73 IN | DIASTOLIC BLOOD PRESSURE: 78 MMHG | SYSTOLIC BLOOD PRESSURE: 170 MMHG

## 2023-02-01 DIAGNOSIS — G89.29 CHRONIC HEAD PAIN: Primary | ICD-10-CM

## 2023-02-01 DIAGNOSIS — R51.9 CHRONIC HEAD PAIN: Primary | ICD-10-CM

## 2023-02-01 DIAGNOSIS — I10 PRIMARY HYPERTENSION: ICD-10-CM

## 2023-02-01 PROCEDURE — 3077F SYST BP >= 140 MM HG: CPT | Performed by: OTHER

## 2023-02-01 PROCEDURE — 3008F BODY MASS INDEX DOCD: CPT | Performed by: OTHER

## 2023-02-01 PROCEDURE — 99204 OFFICE O/P NEW MOD 45 MIN: CPT | Performed by: OTHER

## 2023-02-01 PROCEDURE — 3078F DIAST BP <80 MM HG: CPT | Performed by: OTHER

## 2023-02-01 NOTE — PATIENT INSTRUCTIONS
-Please reach out to Dr. Jalyn Diane about your blood pressure elevation  -MRI brain, MRA brain   -Follow up in 6 months or sooner if needed. -If you develop sudden onset loss of vision, double vision, room spinning/world spinning sensation, inability to speak or understand others who are speaking to you, slurred speech, balance problems, weakness on one side of your body, numbness on one side of your body or worst headache you ever had, please seek out emergency medical attention via 911 for the nearest emergency room to be evaluated for possible stroke. -MyChart or call office with any questions or concerns. Thank you for coming to see me today. The easiest way to communicate with me is via [employer] is meant for simple questions regarding medications, possible side effects, or other simple straight forward questions in limited sentences, rather than multiple paragraphs of discussion. [employer] is not meant for, or efficient for these complex questions, extensive questions, extensive medication adjustments, complex new symptoms or concerns. These issues beyond simple questions require a follow up visit with myself. Refills:  Please pay attention to when your refills will need to be renewed. Due to the volume of phone calls daily, this could potentially take a few days, although we certainly try to honor your refill requests as soon as we can. You should call at least 1 week in advance of needing a refill to ensure you do not run out of medication. Keep in mind that refill requests on Fridays may not be filled until the following week.

## 2023-04-04 ENCOUNTER — OFFICE VISIT (OUTPATIENT)
Dept: CARDIOLOGY | Age: 73
End: 2023-04-04

## 2023-04-04 VITALS
WEIGHT: 194.12 LBS | HEIGHT: 73 IN | BODY MASS INDEX: 25.73 KG/M2 | DIASTOLIC BLOOD PRESSURE: 81 MMHG | HEART RATE: 67 BPM | SYSTOLIC BLOOD PRESSURE: 170 MMHG

## 2023-04-04 DIAGNOSIS — I25.10 CAD IN NATIVE ARTERY: Primary | ICD-10-CM

## 2023-04-04 DIAGNOSIS — E78.00 PURE HYPERCHOLESTEROLEMIA: ICD-10-CM

## 2023-04-04 DIAGNOSIS — I10 ELEVATED BLOOD PRESSURE READING IN OFFICE WITH WHITE COAT SYNDROME, WITH DIAGNOSIS OF HYPERTENSION: ICD-10-CM

## 2023-04-04 DIAGNOSIS — I65.23 CAROTID ARTERY STENOSIS, ASYMPTOMATIC, BILATERAL: ICD-10-CM

## 2023-04-04 DIAGNOSIS — I10 ESSENTIAL HYPERTENSION: ICD-10-CM

## 2023-04-04 PROBLEM — Z86.69 HISTORY OF SLEEP APNEA: Chronic | Status: ACTIVE | Noted: 2023-04-04

## 2023-04-04 PROCEDURE — 99214 OFFICE O/P EST MOD 30 MIN: CPT | Performed by: INTERNAL MEDICINE

## 2023-04-04 PROCEDURE — 3079F DIAST BP 80-89 MM HG: CPT | Performed by: INTERNAL MEDICINE

## 2023-04-04 PROCEDURE — 3077F SYST BP >= 140 MM HG: CPT | Performed by: INTERNAL MEDICINE

## 2023-04-04 RX ORDER — LOSARTAN POTASSIUM AND HYDROCHLOROTHIAZIDE 12.5; 1 MG/1; MG/1
1 TABLET ORAL DAILY
COMMUNITY
Start: 2023-03-24

## 2023-04-04 RX ORDER — AMLODIPINE BESYLATE 5 MG/1
5 TABLET ORAL DAILY
Qty: 90 TABLET | Refills: 3 | Status: SHIPPED | OUTPATIENT
Start: 2023-04-04

## 2023-04-04 SDOH — HEALTH STABILITY: PHYSICAL HEALTH: ON AVERAGE, HOW MANY DAYS PER WEEK DO YOU ENGAGE IN MODERATE TO STRENUOUS EXERCISE (LIKE A BRISK WALK)?: 6 DAYS

## 2023-04-04 SDOH — HEALTH STABILITY: PHYSICAL HEALTH: ON AVERAGE, HOW MANY MINUTES DO YOU ENGAGE IN EXERCISE AT THIS LEVEL?: 30 MIN

## 2023-04-04 ASSESSMENT — PATIENT HEALTH QUESTIONNAIRE - PHQ9
1. LITTLE INTEREST OR PLEASURE IN DOING THINGS: NOT AT ALL
2. FEELING DOWN, DEPRESSED OR HOPELESS: NOT AT ALL
SUM OF ALL RESPONSES TO PHQ9 QUESTIONS 1 AND 2: 0
SUM OF ALL RESPONSES TO PHQ9 QUESTIONS 1 AND 2: 0
CLINICAL INTERPRETATION OF PHQ2 SCORE: NO FURTHER SCREENING NEEDED

## 2023-04-13 ENCOUNTER — TELEPHONE (OUTPATIENT)
Dept: CARDIOLOGY | Age: 73
End: 2023-04-13

## 2023-04-27 NOTE — TELEPHONE ENCOUNTER
Dr Servando Gomez,    I spoke to the pt and he would like you to order the CT scan    He is not going to see Dr Agus Lindquist
Dr. Sandeep Rain--    Patient completed h pylori breath test 11/29/2021 with final results negative. Called in for results and next steps. Please advise if appropriate. Thank you!
I spoke to the pt  He was given the phone number to Central Scheduling to make an appt for the CT scan [phone]
I spoke to the pt's wife Susie Rodriguez.  She states her  is out and she will have him call back when he returns home
Patient checking status on message. Please call. Thank you.
Patient requesting to speak with RN regarding H. Pylori test results. Please call. Thank you.
Patient returned call. Please call. Thank you.
Thanks Fabián Rocaws!!    CT ordered
Thanks all. GI RNs, please call and advise Mr. Brenda Parker that with a negative H. pylori test, the next step for evaluation of abdominal pain would be a CT scan of abdomen and pelvis.   He has undergone multiple recent abdominal ultrasounds and EGD examinatio
n/a

## 2023-05-30 ENCOUNTER — OFFICE VISIT (OUTPATIENT)
Dept: HEMATOLOGY/ONCOLOGY | Age: 73
End: 2023-05-30
Attending: INTERNAL MEDICINE

## 2023-05-30 ENCOUNTER — HOSPITAL ENCOUNTER (OUTPATIENT)
Dept: LAB | Age: 73
Discharge: STILL ADMITTED OR ADMINISTRATIVE | End: 2023-05-30
Attending: INTERNAL MEDICINE

## 2023-05-30 VITALS
SYSTOLIC BLOOD PRESSURE: 173 MMHG | DIASTOLIC BLOOD PRESSURE: 73 MMHG | BODY MASS INDEX: 25.54 KG/M2 | WEIGHT: 192.7 LBS | TEMPERATURE: 98.6 F | OXYGEN SATURATION: 100 % | RESPIRATION RATE: 18 BRPM | HEIGHT: 73 IN | HEART RATE: 70 BPM

## 2023-05-30 DIAGNOSIS — D64.9 ANEMIA, UNSPECIFIED TYPE: ICD-10-CM

## 2023-05-30 DIAGNOSIS — D72.818 OTHER DECREASED WHITE BLOOD CELL (WBC) COUNT: Primary | ICD-10-CM

## 2023-05-30 DIAGNOSIS — D72.818 OTHER DECREASED WHITE BLOOD CELL (WBC) COUNT: ICD-10-CM

## 2023-05-30 DIAGNOSIS — D64.9 ANEMIA, UNSPECIFIED TYPE: Primary | ICD-10-CM

## 2023-05-30 LAB
ALBUMIN SERPL-MCNC: 3.7 G/DL (ref 3.6–5.1)
ALBUMIN/GLOB SERPL: 1.1 {RATIO} (ref 1–2.4)
ALP SERPL-CCNC: 81 UNITS/L (ref 45–117)
ALT SERPL-CCNC: 39 UNITS/L
ANION GAP SERPL CALC-SCNC: 5 MMOL/L (ref 7–19)
AST SERPL-CCNC: 25 UNITS/L
BASOPHILS # BLD: 0 K/MCL (ref 0–0.3)
BASOPHILS NFR BLD: 0 %
BILIRUB SERPL-MCNC: 0.2 MG/DL (ref 0.2–1)
BUN SERPL-MCNC: 12 MG/DL (ref 6–20)
BUN/CREAT SERPL: 12 (ref 7–25)
CALCIUM SERPL-MCNC: 9.7 MG/DL (ref 8.4–10.2)
CHLORIDE SERPL-SCNC: 109 MMOL/L (ref 97–110)
CO2 SERPL-SCNC: 32 MMOL/L (ref 21–32)
CREAT SERPL-MCNC: 1.03 MG/DL (ref 0.67–1.17)
DEPRECATED RDW RBC: 46.3 FL (ref 39–50)
EOSINOPHIL # BLD: 0.3 K/MCL (ref 0–0.5)
EOSINOPHIL NFR BLD: 6 %
ERYTHROCYTE [DISTWIDTH] IN BLOOD: 13.7 % (ref 11–15)
FASTING DURATION TIME PATIENT: ABNORMAL H
FERRITIN SERPL-MCNC: 84 NG/ML (ref 26–388)
GFR SERPLBLD BASED ON 1.73 SQ M-ARVRAT: 77 ML/MIN
GLOBULIN SER-MCNC: 3.3 G/DL (ref 2–4)
GLUCOSE SERPL-MCNC: 130 MG/DL (ref 70–99)
HCT VFR BLD CALC: 35.2 % (ref 39–51)
HGB BLD-MCNC: 11.4 G/DL (ref 13–17)
IMM GRANULOCYTES # BLD AUTO: 0 K/MCL (ref 0–0.2)
IMM GRANULOCYTES # BLD: 0 %
IRON SATN MFR SERPL: 16 % (ref 15–45)
IRON SERPL-MCNC: 48 MCG/DL (ref 65–175)
LYMPHOCYTES # BLD: 2.1 K/MCL (ref 1–4)
LYMPHOCYTES NFR BLD: 44 %
MCH RBC QN AUTO: 30.2 PG (ref 26–34)
MCHC RBC AUTO-ENTMCNC: 32.4 G/DL (ref 32–36.5)
MCV RBC AUTO: 93.1 FL (ref 78–100)
MONOCYTES # BLD: 0.5 K/MCL (ref 0.3–0.9)
MONOCYTES NFR BLD: 10 %
NEUTROPHILS # BLD: 2 K/MCL (ref 1.8–7.7)
NEUTROPHILS NFR BLD: 40 %
NRBC BLD MANUAL-RTO: 0 /100 WBC
PLATELET # BLD AUTO: 182 K/MCL (ref 140–450)
POTASSIUM SERPL-SCNC: 3.7 MMOL/L (ref 3.4–5.1)
PROT SERPL-MCNC: 7 G/DL (ref 6.4–8.2)
RAINBOW EXTRA TUBES HOLD SPECIMEN: NORMAL
RBC # BLD: 3.78 MIL/MCL (ref 4.5–5.9)
SODIUM SERPL-SCNC: 142 MMOL/L (ref 135–145)
TIBC SERPL-MCNC: 306 MCG/DL (ref 250–450)
WBC # BLD: 4.9 K/MCL (ref 4.2–11)

## 2023-05-30 PROCEDURE — 36415 COLL VENOUS BLD VENIPUNCTURE: CPT | Performed by: INTERNAL MEDICINE

## 2023-05-30 PROCEDURE — 82728 ASSAY OF FERRITIN: CPT | Performed by: INTERNAL MEDICINE

## 2023-05-30 PROCEDURE — 99211 OFF/OP EST MAY X REQ PHY/QHP: CPT

## 2023-05-30 PROCEDURE — 99214 OFFICE O/P EST MOD 30 MIN: CPT | Performed by: INTERNAL MEDICINE

## 2023-05-30 PROCEDURE — 83540 ASSAY OF IRON: CPT | Performed by: INTERNAL MEDICINE

## 2023-05-30 PROCEDURE — 3077F SYST BP >= 140 MM HG: CPT | Performed by: INTERNAL MEDICINE

## 2023-05-30 PROCEDURE — 3078F DIAST BP <80 MM HG: CPT | Performed by: INTERNAL MEDICINE

## 2023-05-30 PROCEDURE — 82746 ASSAY OF FOLIC ACID SERUM: CPT | Performed by: INTERNAL MEDICINE

## 2023-05-30 PROCEDURE — 80053 COMPREHEN METABOLIC PANEL: CPT | Performed by: INTERNAL MEDICINE

## 2023-05-30 PROCEDURE — 85025 COMPLETE CBC W/AUTO DIFF WBC: CPT | Performed by: INTERNAL MEDICINE

## 2023-05-30 ASSESSMENT — ENCOUNTER SYMPTOMS
ABDOMINAL DISTENTION: 0
BRUISES/BLEEDS EASILY: 0
TROUBLE SWALLOWING: 0
WEAKNESS: 0
FATIGUE: 0
BLOOD IN STOOL: 0
CHEST TIGHTNESS: 0
ADENOPATHY: 0
SLEEP DISTURBANCE: 0
DIARRHEA: 0
SHORTNESS OF BREATH: 0
APNEA: 0
CONSTIPATION: 0
LIGHT-HEADEDNESS: 0
ACTIVITY CHANGE: 0
DIZZINESS: 0
CHILLS: 0
CHOKING: 0
VOMITING: 0
BACK PAIN: 0
UNEXPECTED WEIGHT CHANGE: 0
DIAPHORESIS: 0
APPETITE CHANGE: 0
ABDOMINAL PAIN: 0
SPEECH DIFFICULTY: 0
HEADACHES: 0
WHEEZING: 0
FEVER: 0
CONFUSION: 0
VOICE CHANGE: 0
NAUSEA: 0
COUGH: 0

## 2023-05-30 ASSESSMENT — PATIENT HEALTH QUESTIONNAIRE - PHQ9
2. FEELING DOWN, DEPRESSED OR HOPELESS: NOT AT ALL
1. LITTLE INTEREST OR PLEASURE IN DOING THINGS: NOT AT ALL
CLINICAL INTERPRETATION OF PHQ2 SCORE: NO FURTHER SCREENING NEEDED
SUM OF ALL RESPONSES TO PHQ9 QUESTIONS 1 AND 2: 0
SUM OF ALL RESPONSES TO PHQ9 QUESTIONS 1 AND 2: 0

## 2023-05-30 ASSESSMENT — PAIN SCALES - GENERAL: PAINLEVEL: 0

## 2023-05-31 ENCOUNTER — TELEPHONE (OUTPATIENT)
Facility: CLINIC | Age: 73
End: 2023-05-31

## 2023-05-31 ENCOUNTER — TELEPHONE (OUTPATIENT)
Dept: HEMATOLOGY/ONCOLOGY | Age: 73
End: 2023-05-31

## 2023-05-31 DIAGNOSIS — K82.4 POLYP OF GALLBLADDER: Primary | ICD-10-CM

## 2023-05-31 DIAGNOSIS — R10.13 EPIGASTRIC ABDOMINAL PAIN: ICD-10-CM

## 2023-05-31 LAB
FOLATE SERPL-MCNC: 16.9 NG/ML
VIT B12 SERPL-MCNC: 841 PG/ML (ref 211–911)

## 2023-05-31 NOTE — TELEPHONE ENCOUNTER
Patient outreach message received:    Last US gallbladder was 06/21/21  Message sent to pt outreach to repeat US on or around 06/21/23

## 2023-05-31 NOTE — TELEPHONE ENCOUNTER
Dr. Luis Alicea    Patient due for 2 year recall ultrasound at end of . Order on file from alternate provider will  right around due date. Please place order and RN can notify patient.     Thank you

## 2023-06-04 NOTE — TELEPHONE ENCOUNTER
Thank you Kerri Gaines. Previous abdominal ultrasound 6/21/2021 and CT abdomen pelvis 12/21/2021 reviewed. Tiny 2.7 mm gallbladder polyp. Follow-up ultrasound ordered today.     - cb

## 2023-06-05 NOTE — TELEPHONE ENCOUNTER
Patient contacted. Aware he is due for recall ultrasound to follow up on gallbladder polyp and order already placed. He wrote down number to call central scheduling which I provided and he will call to schedule.

## 2023-06-13 ENCOUNTER — TELEPHONE (OUTPATIENT)
Dept: INFUSION THERAPY | Age: 73
End: 2023-06-13

## 2023-06-26 ENCOUNTER — HOSPITAL ENCOUNTER (OUTPATIENT)
Dept: ULTRASOUND IMAGING | Facility: HOSPITAL | Age: 73
Discharge: HOME | End: 2023-06-26
Attending: INTERNAL MEDICINE
Payer: MEDICARE

## 2023-06-26 DIAGNOSIS — R10.13 EPIGASTRIC ABDOMINAL PAIN: ICD-10-CM

## 2023-06-26 DIAGNOSIS — K82.4 POLYP OF GALLBLADDER: ICD-10-CM

## 2023-06-26 PROCEDURE — 76705 ECHO EXAM OF ABDOMEN: CPT | Performed by: INTERNAL MEDICINE

## 2023-07-11 ENCOUNTER — TELEPHONE (OUTPATIENT)
Facility: CLINIC | Age: 73
End: 2023-07-11

## 2023-07-12 NOTE — TELEPHONE ENCOUNTER
Thanks all. Please call and advise Mr. Taryn Vargas that his Abdominal Ultrasound of 6/26/2023 showed: Fatty liver (\"hepatic steatosis\")  Tiny 3 mm gallbladder polyp which is not concerning; nearly identical to ultrasound of 2 years ago and before that June 2020. This is good news, tiny gallbladder polyp not changing. If Mr. Taryn Vargas wishes to continue following, we could push back the interval to every 2 years for these ultrasounds.     - cb

## 2023-07-13 NOTE — TELEPHONE ENCOUNTER
Dr Lashaun Gutierrez,    I reviewed your recommendations with the pt    He is concerned about his \"hepatic steatosis\".     This is the first time fatty liver has come up in one of his results    He would like to know your thoughts    He is already slim with a BMI of 23.62 & he states he eats healthy

## 2023-07-14 NOTE — TELEPHONE ENCOUNTER
Spoke to patient and relayed Dr. Razia Vivas message as shown below. Patient verbalized understanding and had no further questions at this time.

## 2023-07-14 NOTE — TELEPHONE ENCOUNTER
From what I remember, Mr. Jovanni Martinez follows a very healthy diet and lifestyle. His last LFTs from [facility] 5/30/2023 looked good with AST 25 ALT 39 alk phosphatase 81. Those are healthy liver numbers. This ultrasound finding is nothing to worry about. We can certainly see again in 1-2 years if it is changing. We can discuss further in his next office visit or a future office visit.     - marin

## 2023-12-05 ENCOUNTER — TELEPHONE (OUTPATIENT)
Dept: CARDIOLOGY | Age: 73
End: 2023-12-05

## 2023-12-05 DIAGNOSIS — I25.10 CAD IN NATIVE ARTERY: Primary | ICD-10-CM

## 2023-12-05 DIAGNOSIS — I10 ESSENTIAL HYPERTENSION: ICD-10-CM

## 2023-12-05 DIAGNOSIS — I10 ELEVATED BLOOD PRESSURE READING IN OFFICE WITH WHITE COAT SYNDROME, WITH DIAGNOSIS OF HYPERTENSION: ICD-10-CM

## 2023-12-05 DIAGNOSIS — E78.00 PURE HYPERCHOLESTEROLEMIA: ICD-10-CM

## 2023-12-05 DIAGNOSIS — I65.23 CAROTID ARTERY STENOSIS, ASYMPTOMATIC, BILATERAL: ICD-10-CM

## 2023-12-12 ENCOUNTER — APPOINTMENT (OUTPATIENT)
Dept: CARDIOLOGY | Age: 73
End: 2023-12-12
Attending: INTERNAL MEDICINE

## 2023-12-12 ENCOUNTER — TELEPHONE (OUTPATIENT)
Dept: CARDIOLOGY | Age: 73
End: 2023-12-12

## 2023-12-12 DIAGNOSIS — I10 ELEVATED BLOOD PRESSURE READING IN OFFICE WITH WHITE COAT SYNDROME, WITH DIAGNOSIS OF HYPERTENSION: ICD-10-CM

## 2023-12-12 DIAGNOSIS — I25.10 CAD IN NATIVE ARTERY: ICD-10-CM

## 2023-12-12 DIAGNOSIS — I65.23 CAROTID ARTERY STENOSIS, ASYMPTOMATIC, BILATERAL: ICD-10-CM

## 2023-12-12 DIAGNOSIS — E78.00 PURE HYPERCHOLESTEROLEMIA: ICD-10-CM

## 2023-12-12 DIAGNOSIS — I10 ESSENTIAL HYPERTENSION: ICD-10-CM

## 2023-12-12 DIAGNOSIS — D64.9 ANEMIA, UNSPECIFIED TYPE: Primary | ICD-10-CM

## 2023-12-12 DIAGNOSIS — D72.818 OTHER DECREASED WHITE BLOOD CELL (WBC) COUNT: ICD-10-CM

## 2023-12-12 PROCEDURE — 93880 EXTRACRANIAL BILAT STUDY: CPT | Performed by: INTERNAL MEDICINE

## 2023-12-18 ENCOUNTER — TELEPHONE (OUTPATIENT)
Dept: CARDIOLOGY | Age: 73
End: 2023-12-18

## 2023-12-29 ENCOUNTER — OFFICE VISIT (OUTPATIENT)
Dept: HEMATOLOGY/ONCOLOGY | Age: 73
End: 2023-12-29
Attending: INTERNAL MEDICINE

## 2023-12-29 ENCOUNTER — LAB SERVICES (OUTPATIENT)
Dept: LAB | Age: 73
End: 2023-12-29
Attending: INTERNAL MEDICINE

## 2023-12-29 VITALS
TEMPERATURE: 98.9 F | DIASTOLIC BLOOD PRESSURE: 78 MMHG | BODY MASS INDEX: 26.53 KG/M2 | SYSTOLIC BLOOD PRESSURE: 149 MMHG | WEIGHT: 200.2 LBS | HEART RATE: 60 BPM | HEIGHT: 73 IN | RESPIRATION RATE: 14 BRPM | OXYGEN SATURATION: 100 %

## 2023-12-29 DIAGNOSIS — D72.818 OTHER DECREASED WHITE BLOOD CELL (WBC) COUNT: Primary | ICD-10-CM

## 2023-12-29 DIAGNOSIS — D64.9 ANEMIA, UNSPECIFIED TYPE: ICD-10-CM

## 2023-12-29 DIAGNOSIS — D72.818 OTHER DECREASED WHITE BLOOD CELL (WBC) COUNT: ICD-10-CM

## 2023-12-29 LAB
ALBUMIN SERPL-MCNC: 4.1 G/DL (ref 3.6–5.1)
ALBUMIN/GLOB SERPL: 1.3 {RATIO} (ref 1–2.4)
ALP SERPL-CCNC: 72 UNITS/L (ref 45–117)
ALT SERPL-CCNC: 37 UNITS/L
ANION GAP SERPL CALC-SCNC: 9 MMOL/L (ref 7–19)
AST SERPL-CCNC: 27 UNITS/L
BASOPHILS # BLD: 0 K/MCL (ref 0–0.3)
BASOPHILS NFR BLD: 1 %
BILIRUB SERPL-MCNC: 0.5 MG/DL (ref 0.2–1)
BUN SERPL-MCNC: 11 MG/DL (ref 6–20)
BUN/CREAT SERPL: 14 (ref 7–25)
CALCIUM SERPL-MCNC: 9.5 MG/DL (ref 8.4–10.2)
CHLORIDE SERPL-SCNC: 106 MMOL/L (ref 97–110)
CO2 SERPL-SCNC: 29 MMOL/L (ref 21–32)
CREAT SERPL-MCNC: 0.8 MG/DL (ref 0.67–1.17)
DEPRECATED RDW RBC: 44.8 FL (ref 39–50)
EGFRCR SERPLBLD CKD-EPI 2021: >90 ML/MIN/{1.73_M2}
EOSINOPHIL # BLD: 0.1 K/MCL (ref 0–0.5)
EOSINOPHIL NFR BLD: 2 %
ERYTHROCYTE [DISTWIDTH] IN BLOOD: 13.4 % (ref 11–15)
FASTING DURATION TIME PATIENT: ABNORMAL H
FERRITIN SERPL-MCNC: 50 NG/ML (ref 26–388)
GLOBULIN SER-MCNC: 3.1 G/DL (ref 2–4)
GLUCOSE SERPL-MCNC: 102 MG/DL (ref 70–99)
HCT VFR BLD CALC: 38.3 % (ref 39–51)
HGB BLD-MCNC: 12.7 G/DL (ref 13–17)
IMM GRANULOCYTES # BLD AUTO: 0 K/MCL (ref 0–0.2)
IMM GRANULOCYTES # BLD: 0 %
IRON SATN MFR SERPL: 28 % (ref 15–45)
IRON SERPL-MCNC: 99 MCG/DL (ref 65–175)
LYMPHOCYTES # BLD: 1.7 K/MCL (ref 1–4)
LYMPHOCYTES NFR BLD: 39 %
MCH RBC QN AUTO: 30.2 PG (ref 26–34)
MCHC RBC AUTO-ENTMCNC: 33.2 G/DL (ref 32–36.5)
MCV RBC AUTO: 91 FL (ref 78–100)
MONOCYTES # BLD: 0.5 K/MCL (ref 0.3–0.9)
MONOCYTES NFR BLD: 11 %
NEUTROPHILS # BLD: 2 K/MCL (ref 1.8–7.7)
NEUTROPHILS NFR BLD: 47 %
NRBC BLD MANUAL-RTO: 0 /100 WBC
PLATELET # BLD AUTO: 170 K/MCL (ref 140–450)
POTASSIUM SERPL-SCNC: 3.9 MMOL/L (ref 3.4–5.1)
PROT SERPL-MCNC: 7.2 G/DL (ref 6.4–8.2)
RBC # BLD: 4.21 MIL/MCL (ref 4.5–5.9)
SODIUM SERPL-SCNC: 140 MMOL/L (ref 135–145)
TIBC SERPL-MCNC: 350 MCG/DL (ref 250–450)
WBC # BLD: 4.3 K/MCL (ref 4.2–11)

## 2023-12-29 PROCEDURE — 36415 COLL VENOUS BLD VENIPUNCTURE: CPT

## 2023-12-29 PROCEDURE — 3077F SYST BP >= 140 MM HG: CPT | Performed by: INTERNAL MEDICINE

## 2023-12-29 PROCEDURE — 80053 COMPREHEN METABOLIC PANEL: CPT

## 2023-12-29 PROCEDURE — 3078F DIAST BP <80 MM HG: CPT | Performed by: INTERNAL MEDICINE

## 2023-12-29 PROCEDURE — 99211 OFF/OP EST MAY X REQ PHY/QHP: CPT

## 2023-12-29 PROCEDURE — 85025 COMPLETE CBC W/AUTO DIFF WBC: CPT

## 2023-12-29 PROCEDURE — 99215 OFFICE O/P EST HI 40 MIN: CPT | Performed by: INTERNAL MEDICINE

## 2023-12-29 PROCEDURE — 82728 ASSAY OF FERRITIN: CPT

## 2023-12-29 PROCEDURE — 83550 IRON BINDING TEST: CPT

## 2023-12-29 PROCEDURE — 82607 VITAMIN B-12: CPT

## 2023-12-29 ASSESSMENT — ENCOUNTER SYMPTOMS
FATIGUE: 0
DIZZINESS: 0
CHILLS: 0
APNEA: 0
CONSTIPATION: 0
BLOOD IN STOOL: 0
TROUBLE SWALLOWING: 0
NAUSEA: 0
SPEECH DIFFICULTY: 0
HEADACHES: 0
SHORTNESS OF BREATH: 0
BRUISES/BLEEDS EASILY: 0
CONFUSION: 0
ABDOMINAL DISTENTION: 0
APPETITE CHANGE: 0
CHOKING: 0
ACTIVITY CHANGE: 0
DIARRHEA: 0
FEVER: 0
WHEEZING: 0
BACK PAIN: 0
COUGH: 0
VOICE CHANGE: 0
UNEXPECTED WEIGHT CHANGE: 0
SLEEP DISTURBANCE: 0
ABDOMINAL PAIN: 0
DIAPHORESIS: 0
WEAKNESS: 0
ADENOPATHY: 0
CHEST TIGHTNESS: 0
VOMITING: 0
LIGHT-HEADEDNESS: 0

## 2023-12-29 ASSESSMENT — PAIN SCALES - GENERAL: PAINLEVEL: 0

## 2023-12-30 LAB
FOLATE SERPL-MCNC: >24 NG/ML
VIT B12 SERPL-MCNC: 1302 PG/ML (ref 211–911)

## 2024-01-04 ENCOUNTER — APPOINTMENT (OUTPATIENT)
Dept: CARDIOLOGY | Age: 74
End: 2024-01-04

## 2024-01-13 ENCOUNTER — APPOINTMENT (OUTPATIENT)
Dept: GENERAL RADIOLOGY | Age: 74
End: 2024-01-13
Attending: EMERGENCY MEDICINE

## 2024-01-13 ENCOUNTER — TELEPHONE (OUTPATIENT)
Facility: CLINIC | Age: 74
End: 2024-01-13

## 2024-01-13 ENCOUNTER — NURSE TRIAGE (OUTPATIENT)
Dept: TELEHEALTH | Age: 74
End: 2024-01-13

## 2024-01-13 ENCOUNTER — HOSPITAL ENCOUNTER (EMERGENCY)
Age: 74
Discharge: HOME | End: 2024-01-13
Attending: EMERGENCY MEDICINE

## 2024-01-13 VITALS
TEMPERATURE: 98 F | OXYGEN SATURATION: 99 % | RESPIRATION RATE: 12 BRPM | HEART RATE: 55 BPM | BODY MASS INDEX: 26.41 KG/M2 | DIASTOLIC BLOOD PRESSURE: 80 MMHG | SYSTOLIC BLOOD PRESSURE: 147 MMHG | HEIGHT: 73 IN

## 2024-01-13 DIAGNOSIS — R07.9 CHEST PAIN, UNSPECIFIED TYPE: Primary | ICD-10-CM

## 2024-01-13 LAB
ALBUMIN SERPL-MCNC: 3.9 G/DL (ref 3.6–5.1)
ALBUMIN/GLOB SERPL: 1.1 {RATIO} (ref 1–2.4)
ALP SERPL-CCNC: 70 UNITS/L (ref 45–117)
ALT SERPL-CCNC: 49 UNITS/L
ANION GAP SERPL CALC-SCNC: 9 MMOL/L (ref 7–19)
AST SERPL-CCNC: 38 UNITS/L
BASOPHILS # BLD: 0 K/MCL (ref 0–0.3)
BASOPHILS NFR BLD: 0 %
BILIRUB SERPL-MCNC: 0.4 MG/DL (ref 0.2–1)
BUN SERPL-MCNC: 15 MG/DL (ref 6–20)
BUN/CREAT SERPL: 19 (ref 7–25)
CALCIUM SERPL-MCNC: 9.1 MG/DL (ref 8.4–10.2)
CHLORIDE SERPL-SCNC: 106 MMOL/L (ref 97–110)
CO2 SERPL-SCNC: 30 MMOL/L (ref 21–32)
CREAT SERPL-MCNC: 0.81 MG/DL (ref 0.67–1.17)
DEPRECATED RDW RBC: 43.5 FL (ref 39–50)
EGFRCR SERPLBLD CKD-EPI 2021: >90 ML/MIN/{1.73_M2}
EOSINOPHIL # BLD: 0.1 K/MCL (ref 0–0.5)
EOSINOPHIL NFR BLD: 2 %
ERYTHROCYTE [DISTWIDTH] IN BLOOD: 13.2 % (ref 11–15)
FASTING DURATION TIME PATIENT: ABNORMAL H
GLOBULIN SER-MCNC: 3.5 G/DL (ref 2–4)
GLUCOSE SERPL-MCNC: 96 MG/DL (ref 70–99)
HCT VFR BLD CALC: 37.1 % (ref 39–51)
HGB BLD-MCNC: 12.3 G/DL (ref 13–17)
IMM GRANULOCYTES # BLD AUTO: 0 K/MCL (ref 0–0.2)
IMM GRANULOCYTES # BLD: 1 %
LYMPHOCYTES # BLD: 2.3 K/MCL (ref 1–4)
LYMPHOCYTES NFR BLD: 37 %
MAGNESIUM SERPL-MCNC: 1.9 MG/DL (ref 1.7–2.4)
MCH RBC QN AUTO: 30.1 PG (ref 26–34)
MCHC RBC AUTO-ENTMCNC: 33.2 G/DL (ref 32–36.5)
MCV RBC AUTO: 90.9 FL (ref 78–100)
MONOCYTES # BLD: 0.7 K/MCL (ref 0.3–0.9)
MONOCYTES NFR BLD: 11 %
NEUTROPHILS # BLD: 3 K/MCL (ref 1.8–7.7)
NEUTROPHILS NFR BLD: 49 %
NRBC BLD MANUAL-RTO: 0 /100 WBC
PLATELET # BLD AUTO: 261 K/MCL (ref 140–450)
POTASSIUM SERPL-SCNC: 3.6 MMOL/L (ref 3.4–5.1)
PROT SERPL-MCNC: 7.4 G/DL (ref 6.4–8.2)
RBC # BLD: 4.08 MIL/MCL (ref 4.5–5.9)
SODIUM SERPL-SCNC: 141 MMOL/L (ref 135–145)
TROPONIN I SERPL DL<=0.01 NG/ML-MCNC: 20 NG/L
TROPONIN I SERPL DL<=0.01 NG/ML-MCNC: 21 NG/L
WBC # BLD: 6.1 K/MCL (ref 4.2–11)

## 2024-01-13 PROCEDURE — 84484 ASSAY OF TROPONIN QUANT: CPT | Performed by: EMERGENCY MEDICINE

## 2024-01-13 PROCEDURE — 85025 COMPLETE CBC W/AUTO DIFF WBC: CPT | Performed by: EMERGENCY MEDICINE

## 2024-01-13 PROCEDURE — 93005 ELECTROCARDIOGRAM TRACING: CPT | Performed by: EMERGENCY MEDICINE

## 2024-01-13 PROCEDURE — 71046 X-RAY EXAM CHEST 2 VIEWS: CPT

## 2024-01-13 PROCEDURE — 80053 COMPREHEN METABOLIC PANEL: CPT | Performed by: EMERGENCY MEDICINE

## 2024-01-13 PROCEDURE — 93010 ELECTROCARDIOGRAM REPORT: CPT | Performed by: INTERNAL MEDICINE

## 2024-01-13 PROCEDURE — 83735 ASSAY OF MAGNESIUM: CPT | Performed by: EMERGENCY MEDICINE

## 2024-01-13 RX ORDER — SUCRALFATE 1 G/1
1 TABLET ORAL
Qty: 30 TABLET | Refills: 0 | Status: SHIPPED | OUTPATIENT
Start: 2024-01-13 | End: 2024-04-12

## 2024-01-13 ASSESSMENT — HEART SCORE
RISK FACTORS: 1-2 RISK FACTORS
AGE: EQUAL OR GREATER THAN 65
EKG: NON SPECIFIC REPOLARIZATION DISTURBANCE
HISTORY: SLIGHTLY SUSPICIOUS
HEART SCORE: 4
TROPONIN: EQUAL OR LESS THAN NORMAL LIMIT

## 2024-01-13 ASSESSMENT — PAIN SCALES - GENERAL: PAINLEVEL_OUTOF10: 4

## 2024-01-13 NOTE — TELEPHONE ENCOUNTER
On-call GI;  Patient contacted me through the , experiencing substernal chest discomfort for the last 7 to 10 days.  This typically happens after meals, he ate late last night and symptoms seem to be worse overnight.  He does have a history of dysphagia in the past and atypical chest pain, he does see a cardiologist.  Saw his primary doctor about 3 days ago.  Denies any shortness of breath, diaphoresis of the symptoms.    Discussed that I cannot differentiate over the phone whether this could be cardiac or reflux/GERD/GI mediated.  I would like him to call his cardiologist who is through [facility]-get input whether any further workup or cardiac testing is needed over the weekend.    He has asked for a prescription for Carafate, I would be happy to call this in for him once he is contacted his cardiologist.  He will call me back after he is talk to them.

## 2024-01-14 LAB
ATRIAL RATE (BPM): 69
P AXIS (DEGREES): 40
PR-INTERVAL (MSEC): 134
QRS-INTERVAL (MSEC): 74
QT-INTERVAL (MSEC): 378
QTC: 405
R AXIS (DEGREES): 37
RAINBOW EXTRA TUBES HOLD SPECIMEN: NORMAL
REPORT TEXT: NORMAL
T AXIS (DEGREES): 15
VENTRICULAR RATE EKG/MIN (BPM): 69

## 2024-01-16 ENCOUNTER — EXTERNAL LAB (OUTPATIENT)
Dept: OTHER | Age: 74
End: 2024-01-16

## 2024-01-16 LAB
ABSOLUTE IMMATURE GRANULOCYTES (OFFPRE24): 0.02 K/UL (ref 0–0.15)
ALBUMIN SERPL-MCNC: 4.6 GM/DL (ref 3.6–5)
ALP SERPL-CCNC: 60 U/L (ref 30–110)
ALT SERPL-CCNC: 25 U/L (ref 10–40)
ANION GAP SERPL CALC-SCNC: 9 MMOL/L (ref 4–16)
AST SERPL-CCNC: 23 U/L (ref 15–45)
BASOPHILS # BLD: 0 K/MM3 (ref 0–0.2)
BASOPHILS NFR BLD: 0 %
BILIRUB SERPL-MCNC: 0.8 MG/DL (ref 0.2–1.4)
BUN SERPL-MCNC: 13 MG/DL (ref 7–22)
CALCIUM SERPL-MCNC: 10.3 MG/DL (ref 8.5–10.5)
CHLORIDE SERPL-SCNC: 103 MMOL/L (ref 98–108)
CHOLEST SERPL-MCNC: 243 MG/DL
CK SERPL-CCNC: 274 U/L (ref 50–320)
CO2 SERPL-SCNC: 30 MMOL/L (ref 20–32)
CREAT SERPL-MCNC: 0.94 MG/DL (ref 0.6–1.4)
CRP SERPL HS-MCNC: 1.61 MG/L
EOSINOPHIL # BLD: 0.1 K/MM3 (ref 0–0.7)
EOSINOPHIL NFR BLD: 2 %
ERYTHROCYTE [DISTWIDTH] IN BLOOD: 13.1 % (ref 11–15)
EST. AVERAGE GLUCOSE BLD GHB EST-MCNC: 114 MG/DL
GFR SERPLBLD SCHWARTZ-ARVRAT: 85 ML/MIN/1.73M2
GLUCOSE SERPL-MCNC: 91 MG/DL (ref 70–100)
HBA1C MFR BLD: 5.6 % (ref 4–6)
HCT VFR BLD CALC: 39.7 % (ref 38–54)
HDLC SERPL-MCNC: 67 MG/DL
HGB BLD-MCNC: 12.7 GM/DL (ref 13–17.5)
IMMATURE GRANULOCYTES (OFFPRE25): <1 %
IRON SATN MFR SERPL: 54 % (ref 20–50)
IRON SERPL-MCNC: 200 UG/DL (ref 40–150)
LDLC SERPL CALC-MCNC: 157 MG/DL
LENGTH OF FAST TIME PATIENT: ABNORMAL H
LENGTH OF FAST TIME PATIENT: NORMAL H
LYMPHOCYTES # BLD: 2.1 K/MM3 (ref 1–4)
LYMPHOCYTES NFR BLD: 48 %
MAGNESIUM SERPL-MCNC: 1.8 MG/DL (ref 1.8–2.5)
MCH RBC QN AUTO: 29.3 PG (ref 27–34)
MCHC RBC AUTO-ENTMCNC: 32 GM/DL (ref 32–36)
MCV RBC AUTO: 91.7 FL (ref 82–99)
MONOCYTES # BLD: 0.6 K/MM3 (ref 0–1)
MONOCYTES NFR BLD: 12 %
MPV (OFFPRE2): 11.8 FL (ref 9–13)
NEUTROPHILS # BLD: 1.7 K/MM3 (ref 1.5–7)
NEUTROPHILS NFR BLD: 38 %
NONHDLC SERPL-MCNC: 176 MG/DL
NRBC BLD MANUAL-RTO: 0 /100 WBC
PLATELET # BLD: 264 K/UL (ref 150–400)
POTASSIUM SERPL-SCNC: 3.5 MMOL/L (ref 3.3–5.1)
PROT SERPL-MCNC: 7.4 GM/DL (ref 6.5–8.3)
RBC # BLD: 4.33 M/UL (ref 4.2–5.8)
SODIUM SERPL-SCNC: 142 MMOL/L (ref 136–144)
T3FREE SERPL-MCNC: 249 PG/DL (ref 171–371)
T4 FREE SERPL-MCNC: 0.9 NG/DL (ref 0.7–1.5)
TIBC SERPL-MCNC: 372 UG/DL (ref 260–480)
TRANSFERRIN SERPL-MCNC: 266 MG/DL (ref 180–329)
TRIGL SERPL-MCNC: 116 MG/DL
TSH SERPL-ACNC: 2.72 UIU/ML (ref 0.46–5.05)
URATE SERPL-MCNC: 4.8 MG/DL (ref 4.4–7.4)
VIT B12 SERPL-MCNC: >1500 PG/ML (ref 180–914)
WBC # BLD: 4.5 K/UL (ref 3.5–10.5)

## 2024-01-18 ENCOUNTER — APPOINTMENT (OUTPATIENT)
Dept: CARDIOLOGY | Age: 74
End: 2024-01-18

## 2024-01-18 VITALS
BODY MASS INDEX: 26.31 KG/M2 | HEART RATE: 65 BPM | WEIGHT: 198.52 LBS | DIASTOLIC BLOOD PRESSURE: 68 MMHG | SYSTOLIC BLOOD PRESSURE: 139 MMHG | HEIGHT: 73 IN

## 2024-01-18 DIAGNOSIS — Z86.69 HISTORY OF SLEEP APNEA: Chronic | ICD-10-CM

## 2024-01-18 DIAGNOSIS — I10 ELEVATED BLOOD PRESSURE READING IN OFFICE WITH WHITE COAT SYNDROME, WITH DIAGNOSIS OF HYPERTENSION: ICD-10-CM

## 2024-01-18 DIAGNOSIS — I25.10 CAD IN NATIVE ARTERY: Primary | ICD-10-CM

## 2024-01-18 DIAGNOSIS — E78.00 PURE HYPERCHOLESTEROLEMIA: ICD-10-CM

## 2024-01-18 DIAGNOSIS — R07.2 PRECORDIAL PAIN: ICD-10-CM

## 2024-01-18 DIAGNOSIS — I65.23 CAROTID ARTERY STENOSIS, ASYMPTOMATIC, BILATERAL: ICD-10-CM

## 2024-01-18 DIAGNOSIS — I10 ESSENTIAL HYPERTENSION: ICD-10-CM

## 2024-01-18 PROCEDURE — 3078F DIAST BP <80 MM HG: CPT | Performed by: INTERNAL MEDICINE

## 2024-01-18 PROCEDURE — 3075F SYST BP GE 130 - 139MM HG: CPT | Performed by: INTERNAL MEDICINE

## 2024-01-18 PROCEDURE — 99214 OFFICE O/P EST MOD 30 MIN: CPT | Performed by: INTERNAL MEDICINE

## 2024-01-18 SDOH — HEALTH STABILITY: PHYSICAL HEALTH: ON AVERAGE, HOW MANY DAYS PER WEEK DO YOU ENGAGE IN MODERATE TO STRENUOUS EXERCISE (LIKE A BRISK WALK)?: 0 DAYS

## 2024-01-18 SDOH — HEALTH STABILITY: PHYSICAL HEALTH: ON AVERAGE, HOW MANY MINUTES DO YOU ENGAGE IN EXERCISE AT THIS LEVEL?: 0 MIN

## 2024-01-18 ASSESSMENT — PATIENT HEALTH QUESTIONNAIRE - PHQ9
1. LITTLE INTEREST OR PLEASURE IN DOING THINGS: NOT AT ALL
CLINICAL INTERPRETATION OF PHQ2 SCORE: NO FURTHER SCREENING NEEDED
2. FEELING DOWN, DEPRESSED OR HOPELESS: NOT AT ALL
SUM OF ALL RESPONSES TO PHQ9 QUESTIONS 1 AND 2: 0
SUM OF ALL RESPONSES TO PHQ9 QUESTIONS 1 AND 2: 0

## 2024-01-31 ENCOUNTER — TELEPHONE (OUTPATIENT)
Facility: CLINIC | Age: 74
End: 2024-01-31

## 2024-01-31 NOTE — TELEPHONE ENCOUNTER
Dr Spence    I spoke to Corby on the phone    He is experiencing chest pressure in his sternum     He is pretty sure his symptoms are from GERD    On-call telephone call with Dr Loyd on 01/13/2024 prompted pt to go to [facility] for evaluation of his chest pain    He saw Dr Correia/Cardiology on 01/18/2024 and he is scheduled for a stress test on 02/02/2024 (See Care Everywhere)    He has been taking Carafate  but is not helping    He is adverse to taking PPI/H2 blockers    Last EGD was reassuring on 09/09/2021    Last OV with you was 07/12/2021

## 2024-02-02 ENCOUNTER — ANCILLARY PROCEDURE (OUTPATIENT)
Dept: CARDIOLOGY | Age: 74
End: 2024-02-02
Attending: INTERNAL MEDICINE

## 2024-02-02 ENCOUNTER — APPOINTMENT (OUTPATIENT)
Dept: CARDIOLOGY | Age: 74
End: 2024-02-02
Attending: INTERNAL MEDICINE

## 2024-02-02 VITALS
BODY MASS INDEX: 26.24 KG/M2 | DIASTOLIC BLOOD PRESSURE: 88 MMHG | HEIGHT: 73 IN | WEIGHT: 198 LBS | HEART RATE: 59 BPM | SYSTOLIC BLOOD PRESSURE: 162 MMHG

## 2024-02-02 DIAGNOSIS — I10 ESSENTIAL HYPERTENSION: ICD-10-CM

## 2024-02-02 DIAGNOSIS — I65.23 CAROTID ARTERY STENOSIS, ASYMPTOMATIC, BILATERAL: ICD-10-CM

## 2024-02-02 DIAGNOSIS — I25.10 CAD IN NATIVE ARTERY: ICD-10-CM

## 2024-02-02 DIAGNOSIS — E78.00 PURE HYPERCHOLESTEROLEMIA: ICD-10-CM

## 2024-02-02 DIAGNOSIS — I10 ELEVATED BLOOD PRESSURE READING IN OFFICE WITH WHITE COAT SYNDROME, WITH DIAGNOSIS OF HYPERTENSION: ICD-10-CM

## 2024-02-02 DIAGNOSIS — R07.2 PRECORDIAL PAIN: ICD-10-CM

## 2024-02-02 DIAGNOSIS — Z86.69 HISTORY OF SLEEP APNEA: Chronic | ICD-10-CM

## 2024-02-02 LAB
HEART RATE RESERVE PREDICTED: -2.74 BPM
LV EF: 59 %
RESTING HR ACHIEVED: 59 BPM
STRESS BASELINE BP: NORMAL MMHG
STRESS PEAK HR: 150 BPM
STRESS PERCENT HR: 103 %
STRESS POST ESTIMATED WORKLOAD: 6.7 METS
STRESS POST EXERCISE DUR MIN: 6 MIN
STRESS POST EXERCISE DUR SEC: 0 SEC
STRESS POST PEAK BP: NORMAL MMHG
STRESS TARGET HR: 146 BPM

## 2024-02-02 PROCEDURE — A9502 TC99M TETROFOSMIN: HCPCS | Performed by: INTERNAL MEDICINE

## 2024-02-02 PROCEDURE — 78452 HT MUSCLE IMAGE SPECT MULT: CPT | Performed by: INTERNAL MEDICINE

## 2024-02-02 PROCEDURE — 93015 CV STRESS TEST SUPVJ I&R: CPT | Performed by: INTERNAL MEDICINE

## 2024-02-02 ASSESSMENT — EXERCISE STRESS TEST
STAGE_CATEGORIES: RECOVERY 2
STAGE_CATEGORIES: RECOVERY 0
PEAK_RPP: 17596
STAGE_CATEGORIES: RECOVERY 1
PEAK_BP: 162/88
STAGE_CATEGORIES: 2
PEAK_HR: 61
PEAK_BP: 162/70
PEAK_HR: 150
PEAK_RPP: 9516
PEAK_HR: 150
PEAK_BP: 166/80
PEAK_HR: 59
PEAK_RPP: 9558
PEAK_HR: 69
PEAK_BP: 192/70
PEAK_RPP: 11178
PEAK_BP: 156/70
PEAK_RPP: 28800
PEAK_BP: 192/70
PEAK_HR: 106
STAGE_CATEGORIES: 1
PEAK_RPP: 28800

## 2024-02-05 NOTE — TELEPHONE ENCOUNTER
I spoke to the pt    I reviewed Dr Spence's below recommendations with him & he voices understanding    We scheduled a f/u appt    Date time and [facility] location confirmed    Your Appointments      Monday March 04, 2024 10:30 AM  Follow Up Visit with Moshe Spence MD  [facility] ([facility]) [facility] [address] [address]  [phone]

## 2024-02-05 NOTE — TELEPHONE ENCOUNTER
Thanks Chema.    I have known Don for many years.    Agree with Cardiology evaluation including cardiac stress test.    My previous EGD examination 9/9/2021 reviewed; exam performed for evaluation of GERD symptoms with dysphagia; mild chronic reflux changes but no Goldberg's esophagus no ulceration no stricture.  Reassuring exam of somewhat likely off medications at that time.  Reassuring colonoscopy examination 9/8/2020.    Please call and advise Don that if the Carafate is not working, his options to get relief are Tums or Pepcid/famotidine.  Though he is averse to medications, the H2 blockers famotidine are harmless as far as we know and have been in common use since at least the 1990s.  He should not be averse to taking that class of medications.  Famotidine is very effective and will give him relief.  Taking it for 2-4 weeks at a time may be enough to cool down his esophagus and get him better for several months.    Otherwise please offer her an expedited visit with me.  Another EGD examination is optional depending on how concerned he has been with his symptoms, but not medically necessary at this point.    - marin

## 2024-02-12 RX ORDER — AMLODIPINE BESYLATE 5 MG/1
5 TABLET ORAL DAILY
Qty: 90 TABLET | Refills: 3 | Status: SHIPPED | OUTPATIENT
Start: 2024-02-12

## 2024-02-16 ASSESSMENT — PATIENT HEALTH QUESTIONNAIRE - PHQ9
CLINICAL INTERPRETATION OF PHQ2 SCORE: NO FURTHER SCREENING NEEDED
SUM OF ALL RESPONSES TO PHQ9 QUESTIONS 1 AND 2: 0
1. LITTLE INTEREST OR PLEASURE IN DOING THINGS: NOT AT ALL
2. FEELING DOWN, DEPRESSED OR HOPELESS: NOT AT ALL
SUM OF ALL RESPONSES TO PHQ9 QUESTIONS 1 AND 2: 0

## 2024-02-20 ENCOUNTER — OFFICE VISIT (OUTPATIENT)
Dept: CARDIOLOGY | Age: 74
End: 2024-02-20

## 2024-02-20 VITALS
RESPIRATION RATE: 16 BRPM | BODY MASS INDEX: 26 KG/M2 | DIASTOLIC BLOOD PRESSURE: 66 MMHG | HEART RATE: 60 BPM | HEIGHT: 73 IN | WEIGHT: 196.21 LBS | SYSTOLIC BLOOD PRESSURE: 158 MMHG

## 2024-02-20 DIAGNOSIS — I25.10 CAD IN NATIVE ARTERY: Primary | ICD-10-CM

## 2024-02-20 DIAGNOSIS — R07.2 PRECORDIAL PAIN: ICD-10-CM

## 2024-02-20 DIAGNOSIS — Z86.69 HISTORY OF SLEEP APNEA: Chronic | ICD-10-CM

## 2024-02-20 DIAGNOSIS — I10 ESSENTIAL HYPERTENSION: ICD-10-CM

## 2024-02-20 DIAGNOSIS — E78.00 PURE HYPERCHOLESTEROLEMIA: ICD-10-CM

## 2024-02-20 DIAGNOSIS — I10 ELEVATED BLOOD PRESSURE READING IN OFFICE WITH WHITE COAT SYNDROME, WITH DIAGNOSIS OF HYPERTENSION: ICD-10-CM

## 2024-02-20 DIAGNOSIS — I65.23 CAROTID ARTERY STENOSIS, ASYMPTOMATIC, BILATERAL: ICD-10-CM

## 2024-02-20 PROCEDURE — 3078F DIAST BP <80 MM HG: CPT | Performed by: INTERNAL MEDICINE

## 2024-02-20 PROCEDURE — 3077F SYST BP >= 140 MM HG: CPT | Performed by: INTERNAL MEDICINE

## 2024-02-20 PROCEDURE — 99214 OFFICE O/P EST MOD 30 MIN: CPT | Performed by: INTERNAL MEDICINE

## 2024-02-20 SDOH — HEALTH STABILITY: PHYSICAL HEALTH: ON AVERAGE, HOW MANY MINUTES DO YOU ENGAGE IN EXERCISE AT THIS LEVEL?: 20 MIN

## 2024-02-20 SDOH — HEALTH STABILITY: PHYSICAL HEALTH: ON AVERAGE, HOW MANY DAYS PER WEEK DO YOU ENGAGE IN MODERATE TO STRENUOUS EXERCISE (LIKE A BRISK WALK)?: 2 DAYS

## 2024-03-04 ENCOUNTER — OFFICE VISIT (OUTPATIENT)
Facility: CLINIC | Age: 74
End: 2024-03-04

## 2024-03-04 VITALS
BODY MASS INDEX: 26.92 KG/M2 | WEIGHT: 203.13 LBS | SYSTOLIC BLOOD PRESSURE: 157 MMHG | HEART RATE: 65 BPM | HEIGHT: 73 IN | DIASTOLIC BLOOD PRESSURE: 80 MMHG

## 2024-03-04 DIAGNOSIS — Z86.19 HISTORY OF HELICOBACTER PYLORI INFECTION: Primary | ICD-10-CM

## 2024-03-04 DIAGNOSIS — R10.13 DYSPEPSIA: ICD-10-CM

## 2024-03-04 DIAGNOSIS — R10.13 EPIGASTRIC PAIN: ICD-10-CM

## 2024-03-04 DIAGNOSIS — R07.89 ATYPICAL CHEST PAIN: ICD-10-CM

## 2024-03-04 PROCEDURE — 1126F AMNT PAIN NOTED NONE PRSNT: CPT | Performed by: INTERNAL MEDICINE

## 2024-03-04 PROCEDURE — 3077F SYST BP >= 140 MM HG: CPT | Performed by: INTERNAL MEDICINE

## 2024-03-04 PROCEDURE — 3008F BODY MASS INDEX DOCD: CPT | Performed by: INTERNAL MEDICINE

## 2024-03-04 PROCEDURE — 3079F DIAST BP 80-89 MM HG: CPT | Performed by: INTERNAL MEDICINE

## 2024-03-04 PROCEDURE — 99214 OFFICE O/P EST MOD 30 MIN: CPT | Performed by: INTERNAL MEDICINE

## 2024-03-04 PROCEDURE — 1159F MED LIST DOCD IN RCRD: CPT | Performed by: INTERNAL MEDICINE

## 2024-03-04 NOTE — PROGRESS NOTES
HPI:    Patient ID: Corby Bose is a 73 year old man well-known to me from previous visits and my previous practice out of [facility], also from recent care of his wife.  I recently found a large sessile adenomatous polyp in her which I and a second subspecialist were unable to resect endoscopically.    Enrico passes on some great news about his wife.  After I and at least one other endoscopist were initially unable to resect a large unusual sessile colon polyp, sounds like that was eventually removed either in 1 procedure or piecemeal.  Apparently she has been checking out well more recently, returning for annual colonoscopy examinations.    Shandra describes recent atypical chest pain, burning epigastric abdominal pain.  He was seen 1 month ago in the [facility] ED, chest x-ray only.  Several months before that, reassuring abdominal ultrasound to rule out biliary colic symptoms.    Burning epigastric abdominal pain appears to resolved with time, remedies not fully disclosed likely including OTC antacids, apple cider vinegar, probiotic.  Enrico appears to be a big proponent of probiotics.  Very averse to the idea of PPI medications.    Enrico and his wife have been on a vegan diet past 5 months.  Remains very focused on his health and longevity.    Recovered well from back surgery almost 1 year ago May 2023.  Evaluated with tests including CT of the head from neurologists question of neurosurgery specialist due to severe headaches.  Headaches are much improved.    As always, Enrico asks about the H. pylori.  Previous H. pylori breath test came back negative here 11/29/2021.    Last EGD examination by myself reassuring 9/9/2021.  Last colonoscopy examination showed no colon polyps 9/8/2020.  Reports that wife is coming in for annual colonoscopy examinations for surveillance of the high risk polyp.    Recent labs [facility]:  CBC 1/16/2024 shows hemoglobin 12.7g MCV 91.7  CMP 1/16/2024 shows normal renal function, AST 23 ALT 25  alkaline phosphatase 60 serum albumin 4.6  Hemoglobin A1c 5.6% on 1/16/2024  Reassuring iron studies 1/16/2024  Creatinine kinase 274 U/L normal on 1/16/2024  ======================  Previous visit 7/12/2021     Corby Bose is a man well-known to me from previous visits and my previous practice out of [facility], also from recent care of his wife.  I recently found a large sessile adenomatous polyp in her which I and a second subspecialist were unable to resect endoscopically.    Corby returns today to discuss a new concern with dysphagia.  He describes his recent ED visit 6/26/2021 regarding dysphagia and atypical chest pain.  He describes the sensation of food getting stuck in his chest, with an extremely uncomfortable chest pressure sensation when that occurs.  He has been very mindful of chewing his food very thoroughly.    Of note, liquids seem to go down during these episodes.  He does not sound fully obstructed when this happens.    In the ED, he was prescribed \"magic mouthwash\" on 6/26/2021.  Sounds like he needed to find a compounding pharmacy, some delay sounds like it was ultimately issued to him on 7/3/2021.  Very expensive medication, over $50 out-of-pocket expense.    During the next 5 days, Corby describes some improvement through July 8th, also remained very attentive to chewing his food very thoroughly.    Swallowing seem to be better until last night when he consumed a meal of potato salad, chicken thighs, broccoli.  He was taking small bites but still felt part of the meal get stuck in his chest, very intense unpleasant chest pressure.  He drank tea and liquids which did go down and the sensation gradually resolved.    More importantly, Corby says he is not able to get all of his pills down recently.  Yesterday and today he describes crushing them in a mortar and pestle to be able to swallow.    Of uncertain relation, Corby describes recent, occasional diffuse right-sided abdominal discomfort  or pain.    As before, Corby is averse to medications and anything that disrupts the body's natural balance.  He is aware of the risks of PPI medications.  He is also very averse to the idea of suppressing his body's stomach acid.    Corby has multiple questions, including whether the symptoms could relate to his previous hiatal hernia or H. pylori infection.    We also reviewed the gallbladder polyp findings on ultrasounds of 2020 and 2021 below.    ======================  Previous visit 6/8/2020:     Corby Bose is a 70 year old man who I have likely known for over 10 years who returns today for follow-up.    In December 2019, Corby was very upset about new diagnosis of significant carotid vascular disease.  Part of his plan to address that problem was a fairly drastic series of dietary changes including fasting, likely high fruit and vegetable intake possibly smoothies.  He called back with severe gas flatus diarrhea as below.  He reversed some of those changes and the gas/diarrhea resolved entirely.    Today Mr Bose returns for follow-up to review blood tests, check in.  No significant complaints today.  He was more concerned that we review his labs and recent events.  On review of systems, he describes significant pain from his spinal stenosis.  Occasional somewhat vague right-sided abdominal pain which is tolerable.    His CPK runs high, 300s-500s past few years.    Mr. Bose believes that his LFTs were previously much much higher.  Previously following with Dr. Anay mckeon for his blood pressure.  Apparently blood pressure readings at home are just about perfect, systolic readings 100 teens-120s.  Here he is running very high today at 192/85.  He states he is very very nervous to be here today.  He denies concern, anxiety about the COVID-19 pandemic, just very nervous about doctors and hearing bad news in general.    After all the above, Corby just went for a follow-up carotid ultrasound last  week.  That came back unexpectedly reassuring.  Sounds like they are now questioning the initial reading of June 2019.  Good news for Corby.    As below, previous colonoscopy examinations was [id number].    ======================  Telephone calls:    Me      12/16/19 1:12 PM   Note      Thank you Chema for speaking to Mr Bose.  Please call him and advise him that this change is from one of the vegetable or fruit regimens or juices he is taking.  Some vegetables and fruits cause gas and diarrhea.  We had discussed during the recent office visit cutting back on his supplements to the bare minimum.  One of his supplements is magnesium which causes diarrhea.  He should hold the magnesium for now if he has been taking it.  Same thing goes for the probiotic.  He should hold the probiotic until this gets better.     Mr. Bose should continue to monitor his symptoms with cutting back as above and looking for any triggers with his recent vegetable and juicing lifestyle.     We should check for C. difficile if any recent antibiotics.     These acute symptoms are almost certainly from his diet and supplements, may be even a stomach bug.  If they do not let up, we certainly could consider a colonoscopy examination next.      Harper Chandler, ROBERT     12/16/19 11:02 AM   Note     Dr Spence,     Last OV with you was 12/02/19     About one week ago pt noted he started to experience a large amount of gas and non bloody diarrhea stools throughout the day. He states it's mostly gas and small amounts of stool     About 90 days ago he became vegan and started \"juicing\".     He was drinking about 90 ounces of fluid a day and cut back to 1 liter a day because her was up urinating all night and wasn't getting any sleep     No changes to medications but he does take a lot of supplements including berdock root.     No antibiotic use.     Typical diet consists of quinoa, walnuts, berries, coconut milk, oyster mushrooms, salad, avocado  garbanzo beans and chickpeas     Denies fever, chills, nausea, vomiting     Does not take any OTC medication for the gas     Past pt of Dr Ventura and possibly CB.     He states his last colonoscopy was over 10 years ago. Colonoscopy recommended at OV       Allison Padgett     12/16/19 10:18 AM   Note      Patient requesting to speak with  regarding gas and fecal output when leaving out gas, happening throughout the day. Please call at:[phone],thanks.      ======================    Previous visit 12/2/2019:    Corby Bose is a 69 year old man who I believe I recognize from my previous practice out of [facility].    Very healthy gentleman, with history hypertension and dyslipidemia and spinal stenosis.    Mr. Bose currently takes a variety of supplements including niacin, multiple vitamins as below.  He is taking losartan and hydrochlorothiazide.    Current supplements up until 3 weeks ago include:  Vitamin C  \"INNATE\" once daily multivitamin  Coenzyme Q 10  \"VITAMIN CODE\" brand \"raw\" vitamin D3 and \"raw\" B12  \"ALIVE!\" brand calcium supplement  Magnesium Glycinate 400mg  \"RAW PROBIOTICS\" probiotic  Niacin    Mr. Bose previously followed with Dr. Ventura.  I believe I saw him while I was in that practice.  It sounds like recent concerns have included occasional right sided mid abdominal pain.    Mr Bose is very concerned by recent diagnosis of carotid vascular disease by Doppler ultrasound.  Apparently work-up for other vascular disease included a reassuring cardiac stress test.    Mr Bose went vegan in September 2019 to rectify this problem.  He stopped all supplements about 3 weeks ago.      Endoscopy history:  EGD examination performed by Dr. Ventura 1/17/2017.  Path report below shows H. pylori negative gastritis, normal esophagus.  Operative report not available.  Mr. Bose reports two \"negative\" colonoscopy examinations, last at least 10 years ago 2005 or 2006.    Social  history:  Former smoker, quit over 40 years ago  No alcohol consumption  Vegan diet as above    Family history noncontributory    Wt Readings from Last 20 Encounters:   03/04/24 203 lb 1.6 oz (92.1 kg)   02/01/23 179 lb (81.2 kg)   09/22/22 179 lb (81.2 kg)   06/21/22 184 lb 8 oz (83.7 kg)   05/12/22 184 lb (83.5 kg)   07/12/21 192 lb (87.1 kg)   06/26/21 190 lb (86.2 kg)   12/16/20 190 lb (86.2 kg)   09/08/20 192 lb (87.1 kg)   07/23/20 196 lb 9.6 oz (89.2 kg)   06/26/20 194 lb 12.8 oz (88.4 kg)   06/08/20 192 lb 3.2 oz (87.2 kg)   12/02/19 191 lb 3.2 oz (86.7 kg)   08/30/19 198 lb (89.8 kg)   07/19/19 198 lb (89.8 kg)   07/19/19 198 lb (89.8 kg)   07/17/19 198 lb (89.8 kg)   06/06/19 198 lb (89.8 kg)     =======================    Lab records received from [facility]:    CBCs [id number] showing hemoglobin running 12.5g - 13.8g  Lipid profiles with total cholesterol initially 233-240, recently 210  CMP results [id number] showing AST and ALT as high as 58 and 91 on 5/31/2018, recently 47 and 51 on 1/27/2020.    =======================    6/10/2019  [facility] CAROTID DUPLEX    [facility]    Patient Name: Corby Bose    Patient YOB: 1950      Procedure: [facility] CAROTID DUPLEX    Ordering Provider: Chris Correia MD    Date of Service: 6/7/19    Indications: CAD in native artery, Elevated blood pressure reading in   office with white coat syndrome, with diagnosis of hypertension, Pure   hypercholesterolemia, Other specified injury of left carotid artery,   initial encounter      Impression:   1.  50-69% Left Internal Carotid Artery Stenosis. There are elevated   velocities in the mid to distal left ICA with minimal plaque. This may be   overestimated due to tortuosity.  2.  1-15% Right Internal Carotid Artery Stenosis.  3.  Patent vertebral arteries bilaterally with antegrade doppler flow.  4.  No previous study available for comparison.    =======================    US VASC CAROTID  DUPLEX    6/1/2020  [facility]  Vascular Study    Patient Name: Corby Bose    Patient YOB: 1950    Account: [id number]    Procedure: US [facility] CAROTID DUPLEX    Ordering Provider: Chris Correia MD    Date of Service: 6/1/2020    Indications: Bilateral carotid artery stenosis    Procedure description:  Carotid:  High-resolution cerebrovascular duplex   technique was used to investigate the cerebrovascular systems where   accessible to study in the neck.  Gray scale real-time imaging, spectral   Doppler analysis and color duplex imaging were utilized.      Impression:   1.  16-49% Right Internal Carotid Artery Stenosis.  2.  1-15% Left Internal Carotid Artery Stenosis.  3.  Patent vertebral arteries bilaterally with antegrade doppler flow.  4.  Abnormal thyroid tissue on the left, correlate clinically.  Compared to the previous study on 6/7/2019, There has been a significant   downgrade of the left ICA stenosis classification due to the lower   velocities obtained on this study. The previously elevated velocities were   felt to be overestimated due to tortuosity.    =======================    6/18/2020     US ABDOMEN LIMITED (CPT=76705)       COMPARISON:   None.       INDICATIONS:   Abnormal levels of other serum enzymes       TECHNIQUE:   Limited evaluation of the areas indicated in the order with gray scale and colorflow of the main vessels where appropriate.  Areas scanned:  Right upper quadrant abdomen.       FINDINGS:   LIVER:   Normal.  Normal size, echotexture and color flow.  No masses or duct dilatation.  Normal Doppler directional flow in the portal and hepatic veins.     GALLBLADDER:   4 mm polyp noted.  Several small foci of increased echogenicity noted within the lumen of the gallbladder without posterior acoustic shadowing, suggestive of small bile crystals.  No definite calculi identified.  The gallbladder wall is   not  thickened and there is no pericholecystic fluid.  Negative Quispe's sign.   BILE DUCTS:   Normal.  Common bile duct measures 2 mm.     PANCREAS:   Obscured by bowel gas.   OTHER:   Negative.         CONCLUSION:   1. Normal appearance of liver.   2. Tiny gallbladder polyp and suggestion of minimal bile crystals within the gallbladder.  No definite cholelithiasis.               DICTATED BY (CST): KAYLA COPE MD ON 6/18/2020 AT 7:42 AM   =======================    6/21/2021 : US ABDOMEN LIMITED (CPT=76705)       COMPARISON:   [facility], US ABDOMEN LIMITED (CPT=76705), 6/18/2020, 7:14 AM.       INDICATIONS:   Polyp of gallbladder       TECHNIQUE:   Limited evaluation of the areas indicated in the order with gray scale and colorflow of the main vessels where appropriate.  Areas scanned:  Gallbladder, liver and pancreas       FINDINGS:   LIVER:   Normal.  Normal size, echotexture and color flow.  No masses or duct dilatation.  Patent main portal vein with velocity of 34 centimeters/second.  Patent hepatic veins.     GALLBLADDER:   Small gallbladder wall polyp near the neck of the gallbladder measuring 2.7 x 2.4 x 1.8 mm.   BILE DUCTS:   Normal.  Common bile duct measures 2.7 mm.     PANCREAS:   Pancreas obscured by bowel gas.     OTHER:   Negative.         CONCLUSION:   1. Small gallbladder wall polyp as discussed.  No gallstones or biliary obstruction.  Normal liver.  Pancreas obscured by bowel gas.               DICTATED BY (CST): PARIS DUCKWORTH MD ON 6/21/2021 AT 4:20 PM         =======================  6/26/2023 : US ABDOMEN LIMITED (CPT=76705)      COMPARISON:   [facility], US ABDOMEN LIMITED (CPT=76705), 6/18/2020, 7:14 AM.  [facility], US ABDOMEN LIMITED (CPT=76705), 6/21/2021, 8:44 AM.  [facility], CT ABDOMEN +   PELVIS (CONTRAST ONLY) (CPT=74177), 12/21/2021, 3:10 PM.      INDICATIONS:    Epigastric abdominal pain; polyp of gallbladder (K82.4).      TECHNIQUE:   Limited evaluation of the areas indicated in the order with gray scale and colorflow of the main vessels where appropriate. Areas scanned: Targeted sonographic interrogation of the right upper quadrant was performed.      FINDINGS:   LIVER:   Normal in size. There is diffusely increased parenchymal echogenicity and heterogeneous, coarse echotexture, compatible with hepatic steatosis. These features impair penetration of the acoustic beam and reduce sensitivity for detection of hepatic lesions. Within these parameters, no focal masses are identified. There is no intrahepatic biliary ductal dilatation.    GALLBLADDER/CBD: There is a 0.3 x 0.2 x 0.2 cm intraluminal polyp. No echogenic, shadowing calculi are visible. There is no significant biliary sludge. No gallbladder wall thickening, pericholecystic fluid, or intraluminal dilatation is evident. The common bile duct is normal in caliber, measuring 0.3 cm.   RIGHT KIDNEY: Measures 9.9 cm in length. Survey sagittal images demonstrate no collecting system dilatation. There are no visible calculi.    PANCREAS: Visualized portions of the pancreatic head and body have a grossly unremarkable sonographic appearance. Views of the tail are obscured by intervening bowel gas.   OTHER:   Patency and normal hepatopetal flow directionality of the main portal vein is demonstrated with velocity recorded as 30 cm/s.         CONCLUSION:   1. Stable gallbladder polyp. Continued sonographic surveillance should be considered.       2. Sonographic features of hepatic steatosis.      3. Negative for hepatobiliary dilatation.      4. Lesser incidental findings as above.            DICTATED BY (CST): JOHNNY HUBBARD MD ON 6/26/2023 AT 10:41 AM         ======    [facility]     CT Angiography Head with IV Contrast    Ryan Cortes MD - 10/12/2023  .  EXAM:  CT ANGIOGRAPHY HEAD WITH IV CONTRAST,   10/12/2023 3:50 pm    HISTORY:  r/o aneurysm. Cerebral aneurysm.    Per EMR,  History of headaches and an incidental left ICA terminus 1mm bulge on MRA    COMPARISON:  None    TECHNIQUE:  CT angiogram head was performed following bolus injection of 75 ml of Isovue 370. Axial 1 mm sections were obtained from the skull base to the vertex. Multiplanar maximum intensity projection (MIP) images then were developed. Pre and post infusion axial 3 mm sections were obtained through the brain.    The CT scan was performed with attention to patient radiation dose reduction, as low as reasonably achievable (ALARA), while maintaining diagnostic image quality. At least one of the following dose reduction techniques was used: Automated exposure control, adjustment of the mA and/or kV according to patient size, use of iterative reconstruction technique.    FINDINGS:  CT Brain:    No acute intracranial hemorrhage, extra-axial collection, mass effect, or midline shift.    Supra and infratentorial brain parenchyma appears unremarkable. On post infusion images, there is no abnormal enhancement. Ventricles and cortical sulci are age appropriate.    Calvarium and skull base are intact.Included paranasal sinuses, and mastoid air cells are well aerated.      CT Angiogram Head :    No aneurysm within the anterior and posterior intracranial circulation.    Atherosclerotic calcification of the left internal carotid cavernous and supraclinoid segments. Otherwise, no major vessel occlusion or critical stenosis along the arteries of anterior and posterior circulation. Fenestrated anterior communicating artery as an anatomical variant.    Distal vertebral arteries, vertebral junction, and basilar artery are unremarkable.    IMPRESSION:    1. No acute intracranial abnormality or abnormal enhancing lesion.    2. No aneurysm. No significant stenosis or large vessel occlusion occlusion of the intracranial vasculature.        I, Dr. Ryan Cortes, have  personally reviewed this report and concur with its findings and conclusions, as affirmed by my electronic signature.    Resident/Fellow: Lisa Haddad on 10/12/2023 4:03 PM    Attending: Ryan Cortes on 10/12/2023 6:36 PM  Exam End: 10/12/23  3:50 PM     Received From: [facility]    ======    [facility]     XR CHEST PA AND LATERAL 2 VIEWS    Mo Hannah MD   01/13/2024     PA AND LATERAL CHEST RADIOGRAPHS DATED 1/13/2024     CLINICAL INDICATION: Chest pain.     COMPARISON: There are no prior examinations currently available for   comparison.     FINDINGS:     Cardiac size is within normal limits. Mediastinal contour appears within   normal limits. No focal infiltrate is identified. There is no evidence of   pleural effusion or pneumothorax.     IMPRESSION:     No acute radiographic cardiopulmonary abnormality.       Electronically Signed by: MO HANNAH M.D.   Signed on: 1/13/2024 6:24 PM     ======        =======================    Patient Name:  PADMINI CARR  Specimen #:  [id number]  Med. Rec. #:[id number]  Obtained:  1/17/2017  Reported: 1/19/2017    Specimen(s) Received  A: ANTRUM BX  B: ESOPHAGUS BX    FINAL DIAGNOSIS  A. STOMACH, ANTRUM, BIOPSY:  -GASTRIC ANTRAL GLAND MUCOSA WITH MILD REACTIVE GASTROPATHY IN A BACKGROUND OF   INACTIVE CHRONIC GASTRITIS    -NEGATIVE FOR H. PYLORI    B. ESOPHAGUS, BIOPSY:  -SQUAMOUS ESOPHAGEAL MUCOSA WITHIN NORMAL LIMITS  -NO INCREASE IN EOSINOPHILS      ======================  Records received from [facility]:    3 copies of above gastric and esophageal biopsies of 1/17/2017, H. pylori negative gastritis and normal esophagus.    EGD procedure report 1/17/2017  [place]:  Indication: \"Suspected esophageal reflux\"  Findings: \"Diffuse mild inflammation characterized by erythema was found in the gastric antrum.\"  Biopsies taken.  Normal esophagus and duodenum.  Biopsies taken of the duodenum.    =======================    COLONOSCOPY  PROCEDURE REPORT     DATE OF PROCEDURE:  9/8/2020      PCP: VIDYA OLVERA     PREOPERATIVE DIAGNOSIS: Screening colonoscopy examination     POSTOPERATIVE DIAGNOSIS:  See impression.     SURGEON:  Moshe Spence M.D.     SEDATION:    MAC anesthesia provided by the Anesthesia Service.  MAC anesthesia requested due to anticipated intolerance of colonoscopy examination under safe doses conscious sedation medications     COLONOSCOPY PROCEDURE:   After the nature and risks of colonoscopy examination under MAC anesthesia were discussed with the patient and all questions answered, informed consent was obtained.  The patient was sedated as above.       Digital rectal exam was performed which showed no masses.  The Olympus pediatric video colonoscope was placed in the patient's rectum and advanced under direct visualization through the entire length of the colon up to the cecum and terminal ileum.  Retroflex exam performed up the ascending colon to the hepatic flexure.  The cecum was confirmed by landmarks including appendiceal orifice, cecal trifold, ileocecal valve.  Retroflexion was performed in the rectum.     The quality of the prep was excellent.     COLONOSCOPY FINDINGS:    Small internal hemorrhoids only on excellent prep colonoscopy examination to the cecum and terminal ileum.     RECOMMENDATIONS:  High fiber diet.  Repeat colonoscopy examination in 7-10 years.    =========================    [facility]        EGD PROCEDURE REPORT     DATE OF PROCEDURE:  9/9/2021     PCP: VIDYA OLVERA     PREOPERATIVE DIAGNOSIS: GERD with dysphagia     POSTOPERATIVE DIAGNOSIS:  See impression.     SURGEON:  Moshe Spence M.D.     SEDATION:    MAC anesthesia provided by the Anesthesia Service.  MAC anesthesia requested due to anticipated intolerance of EGD examination under safe doses conscious sedation medications     EGD PROCEDURE:    After the nature and risks of EGD  examination with possible esophageal dilation possible biopsy were discussed with the patient and all questions answered, informed consent was obtained.  The patient was sedated as above.     Once sedated, the Olympus adult diagnostic gastroscope was placed in the patient's mouth and advanced under direct visualization through the oropharynx into the esophagus, on down through the stomach and pylorus to the second portion of the duodenum.  Retroflexion was performed in the stomach.     Estimated blood loss: none/insignificant        EGD FINDINGS:    Esophagus and GE junction: Mild chronic reflux esophagitis changes seen at the Z-line, GE junction.  No abnormal esophageal mucosa.  No significant acute inflammatory process seen anywhere above the GE junction in the esophagus.  No chronic inflammatory changes seen above the Z-line.     No discrete stricture appreciated on gentle insufflation distal esophagus and GE junction.  To fully rule out esophageal stricture, an 18-20 mm balloon was inflated to 18 mm, pulled up the esophagus from the GE junction and then inflated all the way up to 20 mm, again pulled up from the GE junction with some mild mucosal trauma at the Z-line.  Photographs taken.     Stomach: Clear secretions present.  Mild inflammatory changes proximal and distal gastric mucosa; random gastric mucosal biopsies obtained     Duodenum: Clear secretions present.  Normal duodenal bulb and second, third portions duodenum.     IMPRESSION:  Mild reflux esophagitis changes seen in this patient with recent severe dysphagia complaints, GERD symptoms, taking only occasional sucralfate for the GERD symptoms.  Trial of 20mm balloon dilation performed as above with minimal impact.  Mild nonspecific chronic gastritis changes in the stomach; similar to previous report/description 1/17/2017 Dr. Ventura; random gastric mucosal biopsies obtained.     RECOMMENDATIONS:     Followup above gastric mucosal biopsy results.    Carafate, antacids as needed for GERD symptoms.  Further work-up as needed should the dysphagia persist or worsen.       Current Outpatient Medications   Medication Sig Dispense Refill    sucralfate 1 g Oral Tab Take 1 tablet (1 g total) by mouth 3 (three) times daily before meals. 30 tablet 0    tadalafil 5 MG Oral Tab Take 1 tablet (5 mg total) by mouth daily.      Calcium-Vitamins C & D (CALCIUM/C/D OR) Take 2 tablets by mouth daily.      Acidophilus/Pectin Oral Cap Take 1 capsule by mouth daily.      hydrochlorothiazide 12.5 MG Oral Cap Take 1 capsule (12.5 mg total) by mouth daily.      losartan 100 MG Oral Tab Take 1 tablet (100 mg total) by mouth daily.      Cholecalciferol (VITAMIN D3) 5000 units Oral Tab       Coenzyme Q10 (COQ10) 100 MG Oral Cap       magnesium oxide 400 MG Oral Tab Take 1 tablet (400 mg total) by mouth daily.      Multiple Vitamin Oral Tab Take 1 tablet by mouth.      Ascorbic Acid (VITAMIN C) 1000 MG Oral Tab Take 1 tablet (1,000 mg total) by mouth daily.      Niacin ER, Antihyperlipidemic, 500 MG Oral Tab CR Take 500 mg by mouth nightly. (Patient not taking: Reported on 3/4/2024)       Allergies:    Imaging: No results found.     PHYSICAL EXAM:   Physical Exam           ASSESSMENT/PLAN:   No diagnosis found.      Lab records received from [facility]:    CBCs [id number] showing hemoglobin running 12.5g - 13.8g  Lipid profiles with total cholesterol initially 233-240, recently 210  CMP results [id number] showing AST and ALT as high as 58 and 91 on 5/31/2018, recently 47 and 51 on 1/27/2020.    11/24/2018: AST 35 ALT 44   7/5/2019: AST 48 ALT 69     1/27/2020:   WBC 3500, hemoglobin 12.9 g, platelets 173,000  BUN 7, creatinine 0.9  Glucose 102  B12  975  AST 47 ALT 51  Total cholesterol 210, triglycerides 61  Hemoglobin A1c 5.6%      73 year old gentleman who I know from my previous practice out of [facility], with history hypertension and  dyslipidemia and spinal stenosis.    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  Surgery has been discussed which Corby has deferred.    Mr. Bose currently takes a variety of supplements including niacin, multiple vitamins as above.  He is taking losartan and hydrochlorothiazide.  Otherwise very averse to prescription medications and specifically H2 blocker/PPI medications.    Previously followed with Dr. Ventura and possibly myself out of [facility].  EGD examination was performed 1/17/2017 possibly for GERD symptoms; last colonoscopy examination was likely 2006 or earlier.    Reassuring recent screening colonoscopy examination 9/8/2020.    Enrico returns for follow-up 7/12/2021 regarding a new concern for dysphagia and atypical chest pain.  He describes intense substernal chest pressure after meals which she attributes to food getting stuck in his esophagus.    Enrico returns for follow-up 3/4/2024 regarding another bout of atypical chest pain, burning epigastric pain, improving.      1.  Previous GERD symptoms, recent subjective dysphagia and atypical chest pain.  Long history of waxing and waning symptoms.  No tripp obstruction events.  Able to swallow liquids when these symptoms develop.  Previous bouts of dysphagia have been evaluated with EGD examinations including trial of EGD and dilation September 2021 as above  Reassuring chest x-ray [facility] 1/13/2024; ultrasound abdomen limited 6/26/2023 as above.  Averse to H2 blocker or PPI medication.  We discussed distant history of H. pylori, could consider noninvasive testing with breath test or stool test.  Previous negative H. pylori breath test 11/29/2021    2.  Vague right-sided abdominal pain  Reassuring physical exam.  Nonspecific symptom.  Liver ultrasound reassuring for this symptom and the previous abnormal LFTs  Reassuring recent EGD and colonoscopy examinations    3.  HTN, Carotid vascular disease  New finding 6/10/2019 apparently ordered due  to history of coronary artery disease, hypertension, dyslipidemia.  Mr. Bose is very concerned and focused on this finding, has changed diet and discontinued many of the above supplements as above.  Follow-up carotid Doppler ultrasound June 2020 reassuring.  Great news.    4.  Abnormal LFTs    BMI Readings from Last 10 Encounters:   03/04/24 26.80 kg/m²   02/01/23 23.62 kg/m²   09/22/22 23.62 kg/m²   06/21/22 24.34 kg/m²   05/12/22 24.28 kg/m²   07/12/21 25.33 kg/m²   06/26/21 25.07 kg/m²   12/16/20 25.07 kg/m²   09/08/20 25.33 kg/m²   07/23/20 25.94 kg/m²        Recent labs [facility]:  CBC 1/16/2024 shows hemoglobin 12.7g MCV 91.7  CMP 1/16/2024 shows normal renal function, AST 23 ALT 25 alkaline phosphatase 60 serum albumin 4.6  Hemoglobin A1c 5.6% on 1/16/2024  Reassuring iron studies 1/16/2024  Creatinine kinase 274 U/L normal on 1/16/2024    Recent hepatic transaminases \"Care Everywhere\" tab reassuring with AST ranging 23-38, ALT ranging 25-47 going back to January 2020 ALT of 40 and 47 June and January 2020; then better    CMP 7/5/2019 showed AST 48 ALT 69  CMP 11/24/2018 showed AST 35 ALT 44  CMP 5/31/2018 showed AST 58 ALT 91  CMP 10/23/2017 showed AST 48 ALT 77  [facility] 7/1/2016 shows AST 39 ALT 50    Hepatitis B and C serologies were negative 6/8/2020    Last labs here 9/15/2022 showed mild anemia, hemoglobin 12.6g MCV 92.7  Last LFTs here 6/8/2020 showed AST 31 ALT 43 alk phosphatase 79      5.  Chronically elevated serum CPK level  Last level at [facility] 1/16/2024 reassuring.    6.  Minute gallbladder polyp on ultrasound 2020, 2021, 2023  No further follow-up at this point    7.  Colon cancer screening  Reassuring colonoscopy examination 9/8/2020  Repeat exam recommended 7-10 years          Prior to this encounter, I spent over 10 minutes preparing for the visit, including reviewing documents from other specialties as well as from PCP and going over test results. During and after the face to face  encounter, I spent an additional 40 minutes discussing the above and counseling this patient, reviewing chart notes and data with patient and composing this note.       Digital transcription software was utilized to produce this note. The note was proofread for content only. Typographical errors may remain.      No orders of the defined types were placed in this encounter.      Meds This Visit:  Requested Prescriptions      No prescriptions requested or ordered in this encounter       Imaging & Referrals:  None       ID#[id number]

## 2024-06-03 DIAGNOSIS — D64.9 ANEMIA, UNSPECIFIED TYPE: Primary | ICD-10-CM

## 2024-06-28 ENCOUNTER — LAB SERVICES (OUTPATIENT)
Dept: LAB | Age: 74
End: 2024-06-28
Attending: INTERNAL MEDICINE

## 2024-06-28 ENCOUNTER — OFFICE VISIT (OUTPATIENT)
Dept: HEMATOLOGY/ONCOLOGY | Age: 74
End: 2024-06-28
Attending: INTERNAL MEDICINE

## 2024-06-28 VITALS
DIASTOLIC BLOOD PRESSURE: 72 MMHG | SYSTOLIC BLOOD PRESSURE: 149 MMHG | HEART RATE: 64 BPM | HEIGHT: 73 IN | TEMPERATURE: 98.5 F | OXYGEN SATURATION: 100 % | BODY MASS INDEX: 26.07 KG/M2 | WEIGHT: 196.7 LBS | RESPIRATION RATE: 14 BRPM

## 2024-06-28 DIAGNOSIS — D72.818 OTHER DECREASED WHITE BLOOD CELL (WBC) COUNT: Primary | ICD-10-CM

## 2024-06-28 DIAGNOSIS — D64.9 ANEMIA, UNSPECIFIED TYPE: ICD-10-CM

## 2024-06-28 LAB
ALBUMIN SERPL-MCNC: 4.1 G/DL (ref 3.6–5.1)
ALBUMIN/GLOB SERPL: 1.4 {RATIO} (ref 1–2.4)
ALP SERPL-CCNC: 80 UNITS/L (ref 45–117)
ALT SERPL-CCNC: 36 UNITS/L
ANION GAP SERPL CALC-SCNC: 8 MMOL/L (ref 7–19)
AST SERPL-CCNC: 27 UNITS/L
BASOPHILS # BLD: 0 K/MCL (ref 0–0.3)
BASOPHILS NFR BLD: 0 %
BILIRUB SERPL-MCNC: 0.7 MG/DL (ref 0.2–1)
BUN SERPL-MCNC: 12 MG/DL (ref 6–20)
BUN/CREAT SERPL: 13 (ref 7–25)
CALCIUM SERPL-MCNC: 9.3 MG/DL (ref 8.4–10.2)
CHLORIDE SERPL-SCNC: 107 MMOL/L (ref 97–110)
CO2 SERPL-SCNC: 29 MMOL/L (ref 21–32)
CREAT SERPL-MCNC: 0.93 MG/DL (ref 0.67–1.17)
DEPRECATED RDW RBC: 46.3 FL (ref 39–50)
EGFRCR SERPLBLD CKD-EPI 2021: 86 ML/MIN/{1.73_M2}
EOSINOPHIL # BLD: 0.1 K/MCL (ref 0–0.5)
EOSINOPHIL NFR BLD: 2 %
ERYTHROCYTE [DISTWIDTH] IN BLOOD: 13.7 % (ref 11–15)
FASTING DURATION TIME PATIENT: ABNORMAL H
FERRITIN SERPL-MCNC: 52 NG/ML (ref 26–388)
FOLATE SERPL-MCNC: >24 NG/ML
GLOBULIN SER-MCNC: 3 G/DL (ref 2–4)
GLUCOSE SERPL-MCNC: 117 MG/DL (ref 70–99)
HCT VFR BLD CALC: 38.9 % (ref 39–51)
HGB BLD-MCNC: 12.5 G/DL (ref 13–17)
IMM GRANULOCYTES # BLD AUTO: 0 K/MCL (ref 0–0.2)
IMM GRANULOCYTES # BLD: 0 %
IRON SATN MFR SERPL: 32 % (ref 15–45)
IRON SERPL-MCNC: 112 MCG/DL (ref 65–175)
LYMPHOCYTES # BLD: 2 K/MCL (ref 1–4)
LYMPHOCYTES NFR BLD: 44 %
MCH RBC QN AUTO: 29.6 PG (ref 26–34)
MCHC RBC AUTO-ENTMCNC: 32.1 G/DL (ref 32–36.5)
MCV RBC AUTO: 92.2 FL (ref 78–100)
MONOCYTES # BLD: 0.5 K/MCL (ref 0.3–0.9)
MONOCYTES NFR BLD: 12 %
NEUTROPHILS # BLD: 2 K/MCL (ref 1.8–7.7)
NEUTROPHILS NFR BLD: 42 %
NRBC BLD MANUAL-RTO: 0 /100 WBC
PLATELET # BLD AUTO: 178 K/MCL (ref 140–450)
POTASSIUM SERPL-SCNC: 4 MMOL/L (ref 3.4–5.1)
PROT SERPL-MCNC: 7.1 G/DL (ref 6.4–8.2)
RBC # BLD: 4.22 MIL/MCL (ref 4.5–5.9)
SODIUM SERPL-SCNC: 140 MMOL/L (ref 135–145)
TIBC SERPL-MCNC: 347 MCG/DL (ref 250–450)
VIT B12 SERPL-MCNC: 907 PG/ML (ref 211–911)
WBC # BLD: 4.7 K/MCL (ref 4.2–11)

## 2024-06-28 PROCEDURE — 85025 COMPLETE CBC W/AUTO DIFF WBC: CPT

## 2024-06-28 PROCEDURE — 83550 IRON BINDING TEST: CPT

## 2024-06-28 PROCEDURE — 82728 ASSAY OF FERRITIN: CPT

## 2024-06-28 PROCEDURE — 80053 COMPREHEN METABOLIC PANEL: CPT

## 2024-06-28 PROCEDURE — 99211 OFF/OP EST MAY X REQ PHY/QHP: CPT

## 2024-06-28 PROCEDURE — 82607 VITAMIN B-12: CPT

## 2024-06-28 ASSESSMENT — ENCOUNTER SYMPTOMS
NAUSEA: 0
CONFUSION: 0
BLOOD IN STOOL: 0
ABDOMINAL PAIN: 0
CHILLS: 0
HEADACHES: 0
ABDOMINAL DISTENTION: 0
FATIGUE: 0
VOICE CHANGE: 0
ACTIVITY CHANGE: 0
LIGHT-HEADEDNESS: 0
BRUISES/BLEEDS EASILY: 0
DIAPHORESIS: 0
WHEEZING: 0
WEAKNESS: 0
ADENOPATHY: 0
CHOKING: 0
SHORTNESS OF BREATH: 0
TROUBLE SWALLOWING: 0
UNEXPECTED WEIGHT CHANGE: 0
APPETITE CHANGE: 0
DIZZINESS: 0
VOMITING: 0
SLEEP DISTURBANCE: 0
SPEECH DIFFICULTY: 0
COUGH: 0
BACK PAIN: 0
FEVER: 0
CHEST TIGHTNESS: 0
CONSTIPATION: 0
APNEA: 0
DIARRHEA: 0

## 2024-06-28 ASSESSMENT — PATIENT HEALTH QUESTIONNAIRE - PHQ9
SUM OF ALL RESPONSES TO PHQ9 QUESTIONS 1 AND 2: 0
SUM OF ALL RESPONSES TO PHQ9 QUESTIONS 1 AND 2: 0
CLINICAL INTERPRETATION OF PHQ2 SCORE: NO FURTHER SCREENING NEEDED
1. LITTLE INTEREST OR PLEASURE IN DOING THINGS: NOT AT ALL
2. FEELING DOWN, DEPRESSED OR HOPELESS: NOT AT ALL

## 2024-06-28 ASSESSMENT — PAIN SCALES - GENERAL: PAINLEVEL: 0

## 2024-07-22 ENCOUNTER — TELEPHONE (OUTPATIENT)
Facility: CLINIC | Age: 74
End: 2024-07-22

## 2024-07-23 RX ORDER — SUCRALFATE 1 G/1
1 TABLET ORAL
Qty: 30 TABLET | Refills: 0 | Status: SHIPPED | OUTPATIENT
Start: 2024-07-23

## 2024-07-23 RX ORDER — SUCRALFATE 1 G/1
TABLET ORAL
Qty: 120 TABLET | Refills: 0 | Status: SHIPPED | OUTPATIENT
Start: 2024-07-23 | End: 2024-07-30

## 2024-07-23 NOTE — TELEPHONE ENCOUNTER
Requested Prescriptions     Pending Prescriptions Disp Refills    SUCRALFATE 1 g Oral Tab [Pharmacy Med Name: SUCRALFATE 1 GM TABLET] 30 tablet 0     Sig: TAKE 1 TABLET (1 G TOTAL) BY MOUTH 3 TIMES A DAY BEFORE MEALS     LOV: 03/04/2024  Last Refill: 01/13/2024

## 2024-07-23 NOTE — TELEPHONE ENCOUNTER
Dr Spence,    I spoke to the pt    He states his GERD symptoms have flared recently    Dr Loyd sent him a refill of sucralfate in January 2024    You last filled the sucralfate in 2021    You saw the pt 03/04/2024    I told him we can send another one month refill and when you return you can send a full prescription if you think it is advisable    One month prescription for sucralfate sent to [facility] in [place]

## 2024-07-30 RX ORDER — SUCRALFATE 1 G/1
TABLET ORAL
Qty: 120 TABLET | Refills: 5 | Status: SHIPPED | OUTPATIENT
Start: 2024-07-30

## 2024-07-30 NOTE — TELEPHONE ENCOUNTER
Thanks Chema!    Prescription for more of the sucralfate sent into [facility] [address] [place].    - cb

## 2024-08-06 ENCOUNTER — TELEPHONE (OUTPATIENT)
Facility: CLINIC | Age: 74
End: 2024-08-06

## 2024-08-06 DIAGNOSIS — R10.13 EPIGASTRIC ABDOMINAL PAIN: Primary | ICD-10-CM

## 2024-08-06 NOTE — TELEPHONE ENCOUNTER
Dr. Spence,    I spoke to patient who states he has been having issues with burning/bloating for the past month. Has taken sucralfate in the past. His symptoms come and go.     He recently had a friend that  from stomach cancer and he wanted to make sure he got checked out. Asking if he can be checked for H. Pylori or needs EGD.

## 2024-08-06 NOTE — TELEPHONE ENCOUNTER
Patient states that he has been having stomach and digestive issues and would like to speak to RN.  Please call.

## 2024-08-09 NOTE — TELEPHONE ENCOUNTER
I spoke to the pt    I reviewed Dr Spence's below recommendations with him    He is aware Dr Spence already ordered the h pylori breath test    He is aware to not eat or drink 2 hours prior to testing. He does not take a PPI or H2 blockers

## 2024-08-09 NOTE — TELEPHONE ENCOUNTER
Thanks Sony for speaking to Mr. Bose.    So sorry to hear about his friend.    Last EGD examination for Mr Bose 3 years ago 9/9/2021 looked great.  That EGD exam showed minimal GERD reflux changes in the esophagus, healthy and normal stomach, but nothing that would predict esophagus or stomach cancer.  Gastric biopsies were negative for H. pylori 3 years ago.    I believe his previous stomach endoscopy exam by Dr. Ventura was January 2017.  Gastric biopsies from that exam also showed healthy stomach and specifically negative for H. pylori.    Last colonoscopy exam with me was 9/8/2020, no colon polyps.    Please call and advise Don that I do not recommend another EGD examination at this point.  As he may have understood, his only risk for gastric cancer would be if H. pylori turned up.  He did not have it in 2021 or 2017.  I can order him the H. pylori breath test if he wishes.    Don does not really like conventional medications.  As always, his options for burning dyspepsia would include Tums, Mylanta, famotidine, OTC or prescription PPI.    - cb

## 2024-08-16 ENCOUNTER — LAB ENCOUNTER (OUTPATIENT)
Dept: LAB | Facility: HOSPITAL | Age: 74
End: 2024-08-16
Attending: INTERNAL MEDICINE
Payer: MEDICARE

## 2024-08-16 DIAGNOSIS — R10.13 EPIGASTRIC ABDOMINAL PAIN: ICD-10-CM

## 2024-08-16 PROCEDURE — 83013 H PYLORI (C-13) BREATH: CPT

## 2024-08-17 ENCOUNTER — LAB SERVICES (OUTPATIENT)
Dept: LAB | Age: 74
End: 2024-08-17

## 2024-08-17 DIAGNOSIS — I65.23 CAROTID ARTERY STENOSIS, ASYMPTOMATIC, BILATERAL: ICD-10-CM

## 2024-08-17 DIAGNOSIS — I10 ELEVATED BLOOD PRESSURE READING IN OFFICE WITH WHITE COAT SYNDROME, WITH DIAGNOSIS OF HYPERTENSION: ICD-10-CM

## 2024-08-17 DIAGNOSIS — I10 ESSENTIAL HYPERTENSION: ICD-10-CM

## 2024-08-17 DIAGNOSIS — Z86.69 HISTORY OF SLEEP APNEA: ICD-10-CM

## 2024-08-17 DIAGNOSIS — I25.10 CAD IN NATIVE ARTERY: ICD-10-CM

## 2024-08-17 DIAGNOSIS — R07.2 PRECORDIAL PAIN: ICD-10-CM

## 2024-08-17 DIAGNOSIS — E78.00 PURE HYPERCHOLESTEROLEMIA: ICD-10-CM

## 2024-08-17 LAB
ALBUMIN SERPL-MCNC: 4.4 G/DL (ref 3.6–5.1)
ALBUMIN/GLOB SERPL: 1.4 {RATIO} (ref 1–2.4)
ALP SERPL-CCNC: 70 UNITS/L (ref 45–117)
ALT SERPL-CCNC: 29 UNITS/L
ANION GAP SERPL CALC-SCNC: 9 MMOL/L (ref 7–19)
AST SERPL-CCNC: 25 UNITS/L
BASOPHILS # BLD: 0 K/MCL (ref 0–0.3)
BASOPHILS NFR BLD: 1 %
BILIRUB SERPL-MCNC: 0.7 MG/DL (ref 0.2–1)
BUN SERPL-MCNC: 11 MG/DL (ref 6–20)
BUN/CREAT SERPL: 12 (ref 7–25)
CALCIUM SERPL-MCNC: 9.9 MG/DL (ref 8.4–10.2)
CHLORIDE SERPL-SCNC: 106 MMOL/L (ref 97–110)
CHOLEST SERPL-MCNC: 244 MG/DL
CHOLEST/HDLC SERPL: 3 {RATIO}
CO2 SERPL-SCNC: 29 MMOL/L (ref 21–32)
CREAT SERPL-MCNC: 0.92 MG/DL (ref 0.67–1.17)
DEPRECATED RDW RBC: 44.7 FL (ref 39–50)
EGFRCR SERPLBLD CKD-EPI 2021: 87 ML/MIN/{1.73_M2}
EOSINOPHIL # BLD: 0.2 K/MCL (ref 0–0.5)
EOSINOPHIL NFR BLD: 4 %
ERYTHROCYTE [DISTWIDTH] IN BLOOD: 13.6 % (ref 11–15)
FASTING DURATION TIME PATIENT: ABNORMAL H
GLOBULIN SER-MCNC: 3.1 G/DL (ref 2–4)
GLUCOSE SERPL-MCNC: 112 MG/DL (ref 70–99)
HCT VFR BLD CALC: 38.9 % (ref 39–51)
HDLC SERPL-MCNC: 81 MG/DL
HGB BLD-MCNC: 12.7 G/DL (ref 13–17)
IMM GRANULOCYTES # BLD AUTO: 0 K/MCL (ref 0–0.2)
IMM GRANULOCYTES # BLD: 0 %
LDLC SERPL CALC-MCNC: 144 MG/DL
LYMPHOCYTES # BLD: 2.3 K/MCL (ref 1–4)
LYMPHOCYTES NFR BLD: 52 %
MCH RBC QN AUTO: 29.3 PG (ref 26–34)
MCHC RBC AUTO-ENTMCNC: 32.6 G/DL (ref 32–36.5)
MCV RBC AUTO: 89.8 FL (ref 78–100)
MONOCYTES # BLD: 0.5 K/MCL (ref 0.3–0.9)
MONOCYTES NFR BLD: 13 %
NEUTROPHILS # BLD: 1.3 K/MCL (ref 1.8–7.7)
NEUTROPHILS NFR BLD: 30 %
NONHDLC SERPL-MCNC: 163 MG/DL
NRBC BLD MANUAL-RTO: 0 /100 WBC
PLATELET # BLD AUTO: 187 K/MCL (ref 140–450)
POTASSIUM SERPL-SCNC: 3.7 MMOL/L (ref 3.4–5.1)
PROT SERPL-MCNC: 7.5 G/DL (ref 6.4–8.2)
RBC # BLD: 4.33 MIL/MCL (ref 4.5–5.9)
SODIUM SERPL-SCNC: 140 MMOL/L (ref 135–145)
TRIGL SERPL-MCNC: 94 MG/DL
WBC # BLD: 4.3 K/MCL (ref 4.2–11)

## 2024-08-17 PROCEDURE — 36415 COLL VENOUS BLD VENIPUNCTURE: CPT | Performed by: INTERNAL MEDICINE

## 2024-08-17 PROCEDURE — 80053 COMPREHEN METABOLIC PANEL: CPT | Performed by: INTERNAL MEDICINE

## 2024-08-17 PROCEDURE — 80061 LIPID PANEL: CPT | Performed by: INTERNAL MEDICINE

## 2024-08-17 PROCEDURE — 85025 COMPLETE CBC W/AUTO DIFF WBC: CPT | Performed by: INTERNAL MEDICINE

## 2024-08-18 LAB — H PYLORI BREATH TEST: NEGATIVE

## 2024-08-22 ENCOUNTER — APPOINTMENT (OUTPATIENT)
Dept: CARDIOLOGY | Age: 74
End: 2024-08-22

## 2024-08-22 VITALS
WEIGHT: 194 LBS | HEART RATE: 54 BPM | HEIGHT: 73 IN | BODY MASS INDEX: 25.71 KG/M2 | OXYGEN SATURATION: 100 % | SYSTOLIC BLOOD PRESSURE: 141 MMHG | DIASTOLIC BLOOD PRESSURE: 68 MMHG

## 2024-08-22 DIAGNOSIS — E78.00 PURE HYPERCHOLESTEROLEMIA: ICD-10-CM

## 2024-08-22 DIAGNOSIS — Z86.69 HISTORY OF SLEEP APNEA: Chronic | ICD-10-CM

## 2024-08-22 DIAGNOSIS — I65.23 CAROTID ARTERY STENOSIS, ASYMPTOMATIC, BILATERAL: ICD-10-CM

## 2024-08-22 DIAGNOSIS — I10 ELEVATED BLOOD PRESSURE READING IN OFFICE WITH WHITE COAT SYNDROME, WITH DIAGNOSIS OF HYPERTENSION: ICD-10-CM

## 2024-08-22 DIAGNOSIS — I10 ESSENTIAL HYPERTENSION: ICD-10-CM

## 2024-08-22 DIAGNOSIS — R07.2 PRECORDIAL PAIN: ICD-10-CM

## 2024-08-22 DIAGNOSIS — I25.10 CAD IN NATIVE ARTERY: Primary | ICD-10-CM

## 2024-08-22 ASSESSMENT — PATIENT HEALTH QUESTIONNAIRE - PHQ9
1. LITTLE INTEREST OR PLEASURE IN DOING THINGS: NOT AT ALL
SUM OF ALL RESPONSES TO PHQ9 QUESTIONS 1 AND 2: 0
SUM OF ALL RESPONSES TO PHQ9 QUESTIONS 1 AND 2: 0
CLINICAL INTERPRETATION OF PHQ2 SCORE: NO FURTHER SCREENING NEEDED
2. FEELING DOWN, DEPRESSED OR HOPELESS: NOT AT ALL

## 2025-01-27 ENCOUNTER — TELEPHONE (OUTPATIENT)
Dept: INFUSION THERAPY | Age: 75
End: 2025-01-27

## 2025-01-28 RX ORDER — AMLODIPINE BESYLATE 5 MG/1
5 TABLET ORAL DAILY
Qty: 90 TABLET | Refills: 3 | Status: SHIPPED | OUTPATIENT
Start: 2025-01-28

## 2025-03-21 ENCOUNTER — OFFICE VISIT (OUTPATIENT)
Dept: OTOLARYNGOLOGY | Facility: CLINIC | Age: 75
End: 2025-03-21

## 2025-03-21 DIAGNOSIS — J38.7 LARYNGEAL CYST: Primary | ICD-10-CM

## 2025-03-21 PROCEDURE — 1159F MED LIST DOCD IN RCRD: CPT | Performed by: SPECIALIST

## 2025-03-21 PROCEDURE — 31575 DIAGNOSTIC LARYNGOSCOPY: CPT | Performed by: SPECIALIST

## 2025-03-21 PROCEDURE — 99203 OFFICE O/P NEW LOW 30 MIN: CPT | Performed by: SPECIALIST

## 2025-03-21 PROCEDURE — 1160F RVW MEDS BY RX/DR IN RCRD: CPT | Performed by: SPECIALIST

## 2025-03-21 RX ORDER — AMLODIPINE BESYLATE 5 MG/1
5 TABLET ORAL DAILY
COMMUNITY
Start: 2023-04-04

## 2025-03-22 NOTE — PATIENT INSTRUCTIONS
The area in question on your tongue were just enlarged veins.  These were not abnormal.  Fiberoptic exam did show a cyst of the left aryepiglottic fold.  This looks benign.  Unsigned that it would be getting larger would be to have food or pills getting stuck in the throat.  Follow-up in 4 to 6 months time to recheck the area, sooner if problems.

## 2025-03-22 NOTE — PROGRESS NOTES
Corby Bose is a 74 year old male.   Chief Complaint   Patient presents with    Bump     Tongue bump      HPI:   Patient here wanting to have his tongue and throat checked.    Current Outpatient Medications   Medication Sig Dispense Refill    amLODIPine 5 MG Oral Tab Take 1 tablet (5 mg total) by mouth daily.      sucralfate 1 g Oral Tab Dissolve and mix 1 tablet into a glass of water then drink; take 4 times daily before meals and at bedtime 120 tablet 5    SUCRALFATE 1 g Oral Tab TAKE 1 TABLET (1 G TOTAL) BY MOUTH 3 TIMES A DAY BEFORE MEALS 30 tablet 0    tadalafil 5 MG Oral Tab Take 1 tablet (5 mg total) by mouth daily.      Calcium-Vitamins C & D (CALCIUM/C/D OR) Take 2 tablets by mouth daily.      Acidophilus/Pectin Oral Cap Take 1 capsule by mouth daily.      losartan 100 MG Oral Tab Take 1 tablet (100 mg total) by mouth daily.      Cholecalciferol (VITAMIN D3) 5000 units Oral Tab       Coenzyme Q10 (COQ10) 100 MG Oral Cap       magnesium oxide 400 MG Oral Tab Take 1 tablet (400 mg total) by mouth daily.      Multiple Vitamin Oral Tab Take 1 tablet by mouth.      Ascorbic Acid (VITAMIN C) 1000 MG Oral Tab Take 1 tablet (1,000 mg total) by mouth daily.      hydrochlorothiazide 12.5 MG Oral Cap Take 1 capsule (12.5 mg total) by mouth daily. (Patient not taking: Reported on 3/21/2025)        Past Medical History:    Essential hypertension    Gastritis    High blood pressure      Social History:  Social History     Socioeconomic History    Marital status:    Tobacco Use    Smoking status: Former     Current packs/day: 0.00     Average packs/day: 1 pack/day for 10.0 years (10.0 ttl pk-yrs)     Types: Cigarettes     Start date:      Quit date: 1972     Years since quittin.2     Passive exposure: Past    Smokeless tobacco: Never   Vaping Use    Vaping status: Never Used   Substance and Sexual Activity    Alcohol use: Never    Drug use: Never     Social Drivers of Health     Food Insecurity: No Food  Insecurity (4/20/2023)    Received from [facility]    Hunger Vital Sign     Worried About Running Out of Food in the Last Year: Never true     Ran Out of Food in the Last Year: Never true   Transportation Needs: No Transportation Needs (4/20/2023)    Received from [facility]    PRAPARE - Transportation     Lack of Transportation (Medical): No     Lack of Transportation (Non-Medical): No   Stress: No Stress Concern Present (4/20/2023)    Received from [facility]    [employer] - Occupational Stress Questionnaire     Feeling of Stress : Only a little    [facility]    Housing Stability        REVIEW OF SYSTEMS:   GENERAL HEALTH: feels well otherwise  GENERAL : denies fever, chills, sweats, weight loss, weight gain  SKIN: denies any unusual skin lesions or rashes  RESPIRATORY: denies shortness of breath with exertion  NEURO: denies headaches    EXAM:   There were no vitals taken for this visit.  System Details   Skin Inspection - Normal.   Constitutional Overall appearance - Normal.   Head/Face Facial features - Normal. Eyebrows - Normal. Skull - Normal.   Eyes Conjunctiva - Right: Normal, Left: Normal. Pupil - Right: Normal, Left: Normal.    Ears Inspection - Right: Normal, Left: Normal.   Canal - Right: Normal, Left: Normal.    TM - Right: Normal, Left: Normal.   Nasal External nose - Normal.   Nasal septum - Normal.  Turbinates - Normal   Oral/Oropharynx Lips - Normal, Tonsils - Normal, Tongue -prominent sublingual veins however no tumors or masses by visualization or palpation.   Neck Exam Inspection - Normal. Palpation - Normal. Parotid gland - Normal. Thyroid gland - Normal.   Lymph Detail Submental. Submandibular. Anterior cervical. Posterior cervical. Supraclavicular.  All without enlargement   Psychiatric Orientation - Oriented to time, place, person & situation. Appropriate  mood and affect.   Neurological Memory - Normal. Cranial nerves - Cranial nerves II through XII grossly intact.   Nasopharynx Normal by fiberoptic exam   Larynx Consent was obtained for the procedure.  The nose was asesthetized with 1% neosynephrine and 4% lidocaine topical drops.    The scope was placed into the bilateral nares as well as the nasopharynx, hypopharynx and larynx.    All of which were examined.  The  entire larynx including the vallecula, epiglottis, true and false vocal cords, aryepiglottic folds, piriform sinuses and post cricord area were examined for tumors and infectious processes as well as for evidence of reflux and retained secretions.  Vocal cord mobility was also assessed.    Any abnormalities are noted in the exam section.  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  No other tumors or masses seen.  No retained secretions.  Normal vocal cord mobility.     ASSESSMENT AND PLAN:   1. Laryngeal cyst  Recheck in 4 to 6 months time, sooner if problems.  Patient is aware that one of the symptoms he might have is that something getting stuck in the throat beneath the cyst.      The patient indicates understanding of these issues and agrees to the plan.      Agata Berman MD  3/21/2025  10:31 PM

## 2025-04-11 ENCOUNTER — TELEPHONE (OUTPATIENT)
Dept: PULMONOLOGY | Age: 75
End: 2025-04-11

## 2025-04-16 ENCOUNTER — APPOINTMENT (OUTPATIENT)
Dept: PULMONOLOGY | Age: 75
End: 2025-04-16

## 2025-04-16 ENCOUNTER — TELEPHONE (OUTPATIENT)
Facility: CLINIC | Age: 75
End: 2025-04-16

## 2025-04-16 VITALS
WEIGHT: 195.99 LBS | HEIGHT: 73 IN | BODY MASS INDEX: 25.98 KG/M2 | OXYGEN SATURATION: 100 % | HEART RATE: 62 BPM | SYSTOLIC BLOOD PRESSURE: 168 MMHG | RESPIRATION RATE: 16 BRPM | TEMPERATURE: 97.8 F | DIASTOLIC BLOOD PRESSURE: 92 MMHG

## 2025-04-16 DIAGNOSIS — G47.33 OSA (OBSTRUCTIVE SLEEP APNEA): Primary | ICD-10-CM

## 2025-04-16 PROCEDURE — 3077F SYST BP >= 140 MM HG: CPT | Performed by: INTERNAL MEDICINE

## 2025-04-16 PROCEDURE — 3080F DIAST BP >= 90 MM HG: CPT | Performed by: INTERNAL MEDICINE

## 2025-04-16 PROCEDURE — 99204 OFFICE O/P NEW MOD 45 MIN: CPT | Performed by: INTERNAL MEDICINE

## 2025-04-16 RX ORDER — CALCIUM CARBONATE/VITAMIN D3 500MG-5MCG
TABLET ORAL
COMMUNITY

## 2025-04-16 RX ORDER — LANOLIN ALCOHOL/MO/W.PET/CERES
1000 CREAM (GRAM) TOPICAL DAILY
COMMUNITY

## 2025-04-16 ASSESSMENT — SLEEP AND FATIGUE QUESTIONNAIRES
HOW LIKELY ARE YOU TO NOD OFF OR FALL ASLEEP WHILE SITTING INACTIVE IN A PUBLIC PLACE: WOULD NEVER DOZE
HOW LIKELY ARE YOU TO NOD OFF OR FALL ASLEEP WHEN YOU ARE A PASSENGER IN A CAR FOR AN HOUR WITHOUT A BREAK: WOULD NEVER DOZE
HOW LIKELY ARE YOU TO NOD OFF OR FALL ASLEEP WHILE LYING DOWN TO REST IN THE AFTERNOON WHEN CIRCUMSTANCES PERMIT: WOULD NEVER DOZE
HOW LIKELY ARE YOU TO NOD OFF OR FALL ASLEEP WHILE SITTING QUIETLY AFTER LUNCH WITHOUT ALCOHOL: WOULD NEVER DOZE
HOW LIKELY ARE YOU TO NOD OFF OR FALL ASLEEP WHILE SITTING AND TALKING TO SOMEONE: WOULD NEVER DOZE
HOW LIKELY ARE YOU TO NOD OFF OR FALL ASLEEP IN A CAR, WHILE STOPPED FOR A FEW MINUTES IN TRAFFIC: WOULD NEVER DOZE
HOW LIKELY ARE YOU TO NOD OFF OR FALL ASLEEP WHILE WATCHING TV: SLIGHT CHANCE OF DOZING
HOW LIKELY ARE YOU TO NOD OFF OR FALL ASLEEP WHILE SITTING AND READING: WOULD NEVER DOZE
ESS TOTAL SCORE: 1

## 2025-04-16 ASSESSMENT — PAIN SCALES - GENERAL: PAINLEVEL_OUTOF10: 0

## 2025-04-16 NOTE — TELEPHONE ENCOUNTER
Patient has stomach upset, rumbling and concerned that he may have H-Pylori. Please call at [phone],ok to leave message, thanks.

## 2025-04-17 NOTE — TELEPHONE ENCOUNTER
Patient returning call, please call at [phone], patient will be available only within this hour, thanks.

## 2025-04-17 NOTE — TELEPHONE ENCOUNTER
Your Appointments      Monday May 19, 2025 9:30 AM  Follow Up Visit with Moshe Spence MD  [facility] ([facility]) [address] [place] [address]  [facility] [id number]  [phone]         LOV 03/04/2024    Pt asking for h pylori test    Pt just had h pylori breath test 08/16/2024    Pt experiencing burning in his esophagus. States his stomach is \"rumbling\"    We scheduled an appt to discuss symptoms    Date time and [facility] location confirmed     Your Appointments      Monday May 19, 2025 9:30 AM  Follow Up Visit with Moshe Spence MD  [facility], [facility] ([facility]) [address] [facility] [address]  [facility] [id number]  [phone]

## 2025-05-05 ENCOUNTER — OFFICE VISIT (OUTPATIENT)
Dept: SLEEP MEDICINE | Age: 75
End: 2025-05-05
Attending: INTERNAL MEDICINE

## 2025-05-05 DIAGNOSIS — G47.33 OSA (OBSTRUCTIVE SLEEP APNEA): ICD-10-CM

## 2025-05-05 PROCEDURE — 95800 SLP STDY UNATTENDED: CPT | Performed by: SPECIALIST

## 2025-05-06 LAB — REPORT TEXT: NORMAL

## 2025-05-09 PROCEDURE — 95800 SLP STDY UNATTENDED: CPT | Performed by: SPECIALIST

## 2025-05-12 ENCOUNTER — RESULTS FOLLOW-UP (OUTPATIENT)
Dept: INTERNAL MEDICINE | Age: 75
End: 2025-05-12

## 2025-05-15 ENCOUNTER — APPOINTMENT (OUTPATIENT)
Dept: CARDIOLOGY | Age: 75
End: 2025-05-15

## 2025-05-15 VITALS
BODY MASS INDEX: 25.83 KG/M2 | HEART RATE: 65 BPM | HEIGHT: 73 IN | WEIGHT: 194.89 LBS | SYSTOLIC BLOOD PRESSURE: 158 MMHG | DIASTOLIC BLOOD PRESSURE: 73 MMHG | OXYGEN SATURATION: 100 %

## 2025-05-15 DIAGNOSIS — I65.23 CAROTID ARTERY STENOSIS, ASYMPTOMATIC, BILATERAL: ICD-10-CM

## 2025-05-15 DIAGNOSIS — I25.10 CAD IN NATIVE ARTERY: Primary | ICD-10-CM

## 2025-05-15 DIAGNOSIS — E78.00 PURE HYPERCHOLESTEROLEMIA: ICD-10-CM

## 2025-05-15 DIAGNOSIS — Z86.69 HISTORY OF SLEEP APNEA: Chronic | ICD-10-CM

## 2025-05-15 DIAGNOSIS — I10 ELEVATED BLOOD PRESSURE READING IN OFFICE WITH WHITE COAT SYNDROME, WITH DIAGNOSIS OF HYPERTENSION: ICD-10-CM

## 2025-05-15 DIAGNOSIS — R07.2 PRECORDIAL PAIN: ICD-10-CM

## 2025-05-15 DIAGNOSIS — I10 ESSENTIAL HYPERTENSION: ICD-10-CM

## 2025-05-22 ENCOUNTER — APPOINTMENT (OUTPATIENT)
Dept: CARDIOLOGY | Age: 75
End: 2025-05-22

## 2025-06-27 ENCOUNTER — APPOINTMENT (OUTPATIENT)
Dept: LAB | Age: 75
End: 2025-06-27
Attending: INTERNAL MEDICINE

## 2025-06-27 ENCOUNTER — APPOINTMENT (OUTPATIENT)
Dept: HEMATOLOGY/ONCOLOGY | Age: 75
End: 2025-06-27
Attending: INTERNAL MEDICINE

## 2025-07-07 ENCOUNTER — LAB SERVICES (OUTPATIENT)
Dept: LAB | Age: 75
End: 2025-07-07
Attending: INTERNAL MEDICINE

## 2025-07-07 ENCOUNTER — TELEPHONE (OUTPATIENT)
Facility: CLINIC | Age: 75
End: 2025-07-07

## 2025-07-07 ENCOUNTER — OFFICE VISIT (OUTPATIENT)
Dept: HEMATOLOGY/ONCOLOGY | Age: 75
End: 2025-07-07
Attending: INTERNAL MEDICINE

## 2025-07-07 VITALS
BODY MASS INDEX: 24.78 KG/M2 | WEIGHT: 187 LBS | TEMPERATURE: 98.1 F | RESPIRATION RATE: 16 BRPM | HEART RATE: 60 BPM | SYSTOLIC BLOOD PRESSURE: 150 MMHG | DIASTOLIC BLOOD PRESSURE: 80 MMHG | HEIGHT: 73 IN | OXYGEN SATURATION: 100 %

## 2025-07-07 DIAGNOSIS — D64.9 ANEMIA, UNSPECIFIED TYPE: Primary | ICD-10-CM

## 2025-07-07 DIAGNOSIS — D64.9 ANEMIA, UNSPECIFIED TYPE: ICD-10-CM

## 2025-07-07 DIAGNOSIS — R10.11 RUQ ABDOMINAL PAIN: Primary | ICD-10-CM

## 2025-07-07 DIAGNOSIS — K82.4 POLYP OF GALLBLADDER: ICD-10-CM

## 2025-07-07 LAB
ALBUMIN SERPL-MCNC: 4 G/DL (ref 3.4–5)
ALBUMIN/GLOB SERPL: 1.2 {RATIO} (ref 1–2.4)
ALP SERPL-CCNC: 64 UNITS/L (ref 45–117)
ALT SERPL-CCNC: 40 UNITS/L
ANION GAP SERPL CALC-SCNC: 6 MMOL/L (ref 7–19)
AST SERPL-CCNC: 29 UNITS/L
BASOPHILS # BLD: 0 K/MCL (ref 0–0.3)
BASOPHILS NFR BLD: 0 %
BILIRUB SERPL-MCNC: 0.7 MG/DL (ref 0.2–1)
BUN SERPL-MCNC: 18 MG/DL (ref 6–20)
BUN/CREAT SERPL: 17 (ref 7–25)
CALCIUM SERPL-MCNC: 9.8 MG/DL (ref 8.4–10.2)
CHLORIDE SERPL-SCNC: 104 MMOL/L (ref 97–110)
CO2 SERPL-SCNC: 32 MMOL/L (ref 21–32)
CREAT SERPL-MCNC: 1.07 MG/DL (ref 0.67–1.17)
DEPRECATED RDW RBC: 44.8 FL (ref 39–50)
EGFRCR SERPLBLD CKD-EPI 2021: 72 ML/MIN/{1.73_M2}
EOSINOPHIL # BLD: 0.1 K/MCL (ref 0–0.5)
EOSINOPHIL NFR BLD: 2 %
ERYTHROCYTE [DISTWIDTH] IN BLOOD: 13.5 % (ref 11–15)
FASTING DURATION TIME PATIENT: ABNORMAL H
FERRITIN SERPL-MCNC: 52 NG/ML (ref 26–388)
GLOBULIN SER-MCNC: 3.3 G/DL (ref 2–4)
GLUCOSE SERPL-MCNC: 140 MG/DL (ref 70–99)
HCT VFR BLD CALC: 37.6 % (ref 39–51)
HGB BLD-MCNC: 12.4 G/DL (ref 13–17)
IMM GRANULOCYTES # BLD AUTO: 0 K/MCL (ref 0–0.2)
IMM GRANULOCYTES # BLD: 0 %
IRON SATN MFR SERPL: 31 % (ref 15–45)
IRON SERPL-MCNC: 108 MCG/DL (ref 65–175)
LYMPHOCYTES # BLD: 2.4 K/MCL (ref 1–4)
LYMPHOCYTES NFR BLD: 53 %
MCH RBC QN AUTO: 30.2 PG (ref 26–34)
MCHC RBC AUTO-ENTMCNC: 33 G/DL (ref 32–36.5)
MCV RBC AUTO: 91.5 FL (ref 78–100)
MONOCYTES # BLD: 0.6 K/MCL (ref 0.3–0.9)
MONOCYTES NFR BLD: 12 %
NEUTROPHILS # BLD: 1.5 K/MCL (ref 1.8–7.7)
NEUTROPHILS NFR BLD: 33 %
NRBC BLD MANUAL-RTO: 0 /100 WBC
PLATELET # BLD AUTO: 162 K/MCL (ref 140–450)
POTASSIUM SERPL-SCNC: 4 MMOL/L (ref 3.4–5.1)
PROT SERPL-MCNC: 7.3 G/DL (ref 6.4–8.2)
RBC # BLD: 4.11 MIL/MCL (ref 4.5–5.9)
SODIUM SERPL-SCNC: 138 MMOL/L (ref 135–145)
TIBC SERPL-MCNC: 348 MCG/DL (ref 250–450)
WBC # BLD: 4.6 K/MCL (ref 4.2–11)

## 2025-07-07 PROCEDURE — 85025 COMPLETE CBC W/AUTO DIFF WBC: CPT

## 2025-07-07 PROCEDURE — 36415 COLL VENOUS BLD VENIPUNCTURE: CPT

## 2025-07-07 PROCEDURE — 99211 OFF/OP EST MAY X REQ PHY/QHP: CPT

## 2025-07-07 PROCEDURE — 82607 VITAMIN B-12: CPT

## 2025-07-07 PROCEDURE — 83540 ASSAY OF IRON: CPT

## 2025-07-07 PROCEDURE — 3077F SYST BP >= 140 MM HG: CPT | Performed by: INTERNAL MEDICINE

## 2025-07-07 PROCEDURE — 3079F DIAST BP 80-89 MM HG: CPT | Performed by: INTERNAL MEDICINE

## 2025-07-07 PROCEDURE — 99215 OFFICE O/P EST HI 40 MIN: CPT | Performed by: INTERNAL MEDICINE

## 2025-07-07 PROCEDURE — 80053 COMPREHEN METABOLIC PANEL: CPT

## 2025-07-07 PROCEDURE — 82728 ASSAY OF FERRITIN: CPT

## 2025-07-07 ASSESSMENT — ENCOUNTER SYMPTOMS
HEADACHES: 0
CHOKING: 0
SLEEP DISTURBANCE: 0
CONFUSION: 0
LIGHT-HEADEDNESS: 0
NAUSEA: 0
WHEEZING: 0
FATIGUE: 0
BACK PAIN: 0
DIARRHEA: 0
TROUBLE SWALLOWING: 0
APNEA: 0
CONSTIPATION: 0
SPEECH DIFFICULTY: 0
ABDOMINAL DISTENTION: 0
DIZZINESS: 0
DIAPHORESIS: 0
WEAKNESS: 0
ACTIVITY CHANGE: 0
SHORTNESS OF BREATH: 0
ADENOPATHY: 0
COUGH: 0
CHEST TIGHTNESS: 0
VOICE CHANGE: 0
ABDOMINAL PAIN: 0
BLOOD IN STOOL: 0
FEVER: 0
APPETITE CHANGE: 0
BRUISES/BLEEDS EASILY: 0
CHILLS: 0
VOMITING: 0
UNEXPECTED WEIGHT CHANGE: 0

## 2025-07-07 ASSESSMENT — PATIENT HEALTH QUESTIONNAIRE - PHQ9
SUM OF ALL RESPONSES TO PHQ9 QUESTIONS 1 AND 2: 0
SUM OF ALL RESPONSES TO PHQ9 QUESTIONS 1 AND 2: 0

## 2025-07-07 ASSESSMENT — PAIN SCALES - GENERAL: PAINLEVEL_OUTOF10: 0

## 2025-07-07 NOTE — TELEPHONE ENCOUNTER
Patient requesting order for ultra sound of the abdomin due to having some sensation and sharp pain. Please call at [phone], ok to leave message, thanks.

## 2025-07-07 NOTE — TELEPHONE ENCOUNTER
Dr Spence-   Spoke with patient states for past week or so he has developed some RUQ pain- states over 4th of July had eaten some fried/fatty foods that he does not normally eat. He has some concerns could be related to gallbladder states polyp was found on US that was done a couple years ago (6/26/23). He would like to have repeat imaging and labs done. Denies fever, vomiting, nausea, diarrhea. States pain is intermittent-but sharp rates 5/10.  Also has hx of hiatal hennia/h . Pylori. Not currently taking sucralfate any longer.  Patient advised on red flag symptoms-severe abdominal pain, vomiting, fever should go to ER.

## 2025-07-07 NOTE — TELEPHONE ENCOUNTER
Thanks Erin.    Recent ?[facility] CMP 3/12/2025 reassuring with normal renal function, AST 24 ALT 24 alk phosphatase 62  Similar to previous CMP 8/17/2024.    Last ultrasound abdomen here 6/26/2023 showed hepatic steatosis, ONE 3 mm gallbladder polyp.    CMP and gallbladder ultrasound ordered.    - cb

## 2025-07-08 LAB
FOLATE SERPL-MCNC: 24 NG/ML
VIT B12 SERPL-MCNC: 1515 PG/ML (ref 211–911)

## 2025-08-10 ENCOUNTER — APPOINTMENT (OUTPATIENT)
Dept: CT IMAGING | Facility: HOSPITAL | Age: 75
End: 2025-08-10
Attending: EMERGENCY MEDICINE

## 2025-08-10 ENCOUNTER — HOSPITAL ENCOUNTER (EMERGENCY)
Facility: HOSPITAL | Age: 75
Discharge: HOME | End: 2025-08-10
Attending: EMERGENCY MEDICINE

## 2025-08-10 VITALS
SYSTOLIC BLOOD PRESSURE: 148 MMHG | TEMPERATURE: 100 F | OXYGEN SATURATION: 98 % | RESPIRATION RATE: 17 BRPM | DIASTOLIC BLOOD PRESSURE: 78 MMHG | HEART RATE: 51 BPM

## 2025-08-10 DIAGNOSIS — R10.13 EPIGASTRIC BURNING SENSATION: Primary | ICD-10-CM

## 2025-08-10 DIAGNOSIS — K59.00 CONSTIPATION, UNSPECIFIED CONSTIPATION TYPE: ICD-10-CM

## 2025-08-10 DIAGNOSIS — R59.9 LYMPH NODE ENLARGEMENT: ICD-10-CM

## 2025-08-10 LAB
ALBUMIN SERPL-MCNC: 5.1 G/DL (ref 3.2–4.8)
ALP LIVER SERPL-CCNC: 55 U/L (ref 45–117)
ALT SERPL-CCNC: 25 U/L (ref 10–49)
ANION GAP SERPL CALC-SCNC: 8 MMOL/L (ref 0–18)
AST SERPL-CCNC: 31 U/L (ref ?–34)
ATRIAL RATE: 57 BPM
BASOPHILS # BLD AUTO: 0.02 X10(3) UL (ref 0–0.2)
BASOPHILS NFR BLD AUTO: 0.5 %
BILIRUB DIRECT SERPL-MCNC: 0.1 MG/DL (ref ?–0.3)
BILIRUB SERPL-MCNC: 0.7 MG/DL (ref 0.2–1.1)
BILIRUB UR QL: NEGATIVE
BUN BLD-MCNC: 14 MG/DL (ref 9–23)
CALCIUM BLD-MCNC: 9.9 MG/DL (ref 8.7–10.4)
CHLORIDE SERPL-SCNC: 103 MMOL/L (ref 98–112)
CLARITY UR: CLEAR
CO2 SERPL-SCNC: 29 MMOL/L (ref 21–32)
CREAT BLD-MCNC: 1.12 MG/DL (ref 0.7–1.3)
DEPRECATED RDW RBC AUTO: 43.3 FL (ref 35.1–46.3)
EGFRCR SERPLBLD CKD-EPI 2021: 69 ML/MIN/1.73M2 (ref 60–?)
EOSINOPHIL # BLD AUTO: 0.09 X10(3) UL (ref 0–0.7)
EOSINOPHIL NFR BLD AUTO: 2.5 %
ERYTHROCYTE [DISTWIDTH] IN BLOOD BY AUTOMATED COUNT: 13.2 % (ref 11–15)
GLUCOSE BLD-MCNC: 97 MG/DL (ref 70–99)
GLUCOSE UR-MCNC: NORMAL MG/DL
HCT VFR BLD AUTO: 38 % (ref 39–53)
HGB BLD-MCNC: 12.7 G/DL (ref 13–17.5)
HGB UR QL STRIP.AUTO: NEGATIVE
IMM GRANULOCYTES # BLD AUTO: 0.02 X10(3) UL (ref 0–1)
IMM GRANULOCYTES NFR BLD: 0.5 %
KETONES UR-MCNC: NEGATIVE MG/DL
LEUKOCYTE ESTERASE UR QL STRIP.AUTO: NEGATIVE
LIPASE SERPL-CCNC: 22 U/L (ref 12–53)
LYMPHOCYTES # BLD AUTO: 1.66 X10(3) UL (ref 1–4)
LYMPHOCYTES NFR BLD AUTO: 45.4 %
MCH RBC QN AUTO: 30 PG (ref 26–34)
MCHC RBC AUTO-ENTMCNC: 33.4 G/DL (ref 31–37)
MCV RBC AUTO: 89.6 FL (ref 80–100)
MONOCYTES # BLD AUTO: 0.44 X10(3) UL (ref 0.1–1)
MONOCYTES NFR BLD AUTO: 12 %
NEUTROPHILS # BLD AUTO: 1.43 X10 (3) UL (ref 1.5–7.7)
NEUTROPHILS # BLD AUTO: 1.43 X10(3) UL (ref 1.5–7.7)
NEUTROPHILS NFR BLD AUTO: 39.1 %
NITRITE UR QL STRIP.AUTO: NEGATIVE
P AXIS: 64 DEGREES
P-R INTERVAL: 150 MS
PH UR: 6 (ref 5–8)
PLATELET # BLD AUTO: 173 10(3)UL (ref 150–450)
POTASSIUM SERPL-SCNC: 3.5 MMOL/L (ref 3.5–5.1)
PROT SERPL-MCNC: 7.4 G/DL (ref 5.7–8.2)
PROT UR-MCNC: NEGATIVE MG/DL
Q-T INTERVAL: 530 MS
QRS DURATION: 74 MS
QTC CALCULATION (BEZET): 515 MS
R AXIS: -46 DEGREES
RBC # BLD AUTO: 4.24 X10(6)UL (ref 3.8–5.8)
SODIUM SERPL-SCNC: 140 MMOL/L (ref 136–145)
SP GR UR STRIP: 1.01 (ref 1–1.03)
T AXIS: 44 DEGREES
TROPONIN I SERPL HS-MCNC: 12 NG/L (ref ?–53)
TROPONIN I SERPL HS-MCNC: 12 NG/L (ref ?–53)
TROPONIN I SERPL HS-MCNC: 14 NG/L (ref ?–53)
UROBILINOGEN UR STRIP-ACNC: NORMAL
VENTRICULAR RATE: 57 BPM
WBC # BLD AUTO: 3.7 X10(3) UL (ref 4–11)

## 2025-08-10 PROCEDURE — 84484 ASSAY OF TROPONIN QUANT: CPT | Performed by: EMERGENCY MEDICINE

## 2025-08-10 PROCEDURE — 99285 EMERGENCY DEPT VISIT HI MDM: CPT

## 2025-08-10 PROCEDURE — 93005 ELECTROCARDIOGRAM TRACING: CPT

## 2025-08-10 PROCEDURE — 83690 ASSAY OF LIPASE: CPT | Performed by: EMERGENCY MEDICINE

## 2025-08-10 PROCEDURE — 85025 COMPLETE CBC W/AUTO DIFF WBC: CPT | Performed by: EMERGENCY MEDICINE

## 2025-08-10 PROCEDURE — 96374 THER/PROPH/DIAG INJ IV PUSH: CPT

## 2025-08-10 PROCEDURE — 71260 CT THORAX DX C+: CPT | Performed by: EMERGENCY MEDICINE

## 2025-08-10 PROCEDURE — 80076 HEPATIC FUNCTION PANEL: CPT | Performed by: EMERGENCY MEDICINE

## 2025-08-10 PROCEDURE — 81003 URINALYSIS AUTO W/O SCOPE: CPT | Performed by: EMERGENCY MEDICINE

## 2025-08-10 PROCEDURE — 93010 ELECTROCARDIOGRAM REPORT: CPT

## 2025-08-10 PROCEDURE — 80048 BASIC METABOLIC PNL TOTAL CA: CPT | Performed by: EMERGENCY MEDICINE

## 2025-08-10 PROCEDURE — 74177 CT ABD & PELVIS W/CONTRAST: CPT | Performed by: EMERGENCY MEDICINE

## 2025-08-10 RX ORDER — MAGNESIUM HYDROXIDE/ALUMINUM HYDROXICE/SIMETHICONE 120; 1200; 1200 MG/30ML; MG/30ML; MG/30ML
10 SUSPENSION ORAL 4 TIMES DAILY PRN
Qty: 355 ML | Refills: 0 | Status: SHIPPED | OUTPATIENT
Start: 2025-08-10

## 2025-08-10 RX ORDER — POLYETHYLENE GLYCOL 3350 17 G/17G
17 POWDER, FOR SOLUTION ORAL DAILY PRN
Qty: 14 EACH | Refills: 0 | Status: SHIPPED | OUTPATIENT
Start: 2025-08-10 | End: 2025-09-09

## 2025-08-10 RX ORDER — PANTOPRAZOLE SODIUM 40 MG/1
40 TABLET, DELAYED RELEASE ORAL DAILY
Qty: 30 TABLET | Refills: 0 | Status: SHIPPED | OUTPATIENT
Start: 2025-08-10 | End: 2025-09-09

## 2025-08-11 LAB
ATRIAL RATE: 49 BPM
P AXIS: 45 DEGREES
P-R INTERVAL: 156 MS
Q-T INTERVAL: 472 MS
QRS DURATION: 72 MS
QTC CALCULATION (BEZET): 426 MS
R AXIS: -37 DEGREES
T AXIS: 34 DEGREES
VENTRICULAR RATE: 49 BPM

## 2025-08-12 ENCOUNTER — TELEPHONE (OUTPATIENT)
Facility: CLINIC | Age: 75
End: 2025-08-12

## 2025-08-14 ENCOUNTER — HOSPITAL ENCOUNTER (OUTPATIENT)
Dept: ULTRASOUND IMAGING | Facility: HOSPITAL | Age: 75
Discharge: HOME | End: 2025-08-14
Attending: INTERNAL MEDICINE

## 2025-08-14 DIAGNOSIS — K82.4 POLYP OF GALLBLADDER: ICD-10-CM

## 2025-08-14 DIAGNOSIS — R10.11 RUQ ABDOMINAL PAIN: ICD-10-CM

## 2025-08-14 PROCEDURE — 76705 ECHO EXAM OF ABDOMEN: CPT | Performed by: INTERNAL MEDICINE

## 2025-08-26 ENCOUNTER — OFFICE VISIT (OUTPATIENT)
Dept: CARDIOLOGY | Age: 75
End: 2025-08-26

## 2025-08-26 VITALS
HEART RATE: 53 BPM | WEIGHT: 187.94 LBS | SYSTOLIC BLOOD PRESSURE: 133 MMHG | DIASTOLIC BLOOD PRESSURE: 80 MMHG | BODY MASS INDEX: 24.91 KG/M2 | OXYGEN SATURATION: 100 % | HEIGHT: 73 IN

## 2025-08-26 DIAGNOSIS — R07.89 CHEST DISCOMFORT: ICD-10-CM

## 2025-08-26 DIAGNOSIS — Z09 HOSPITAL DISCHARGE FOLLOW-UP: Primary | ICD-10-CM

## 2025-08-26 DIAGNOSIS — E78.00 PURE HYPERCHOLESTEROLEMIA: ICD-10-CM

## 2025-08-26 DIAGNOSIS — I25.10 CAD IN NATIVE ARTERY: ICD-10-CM

## 2025-08-26 DIAGNOSIS — I10 ESSENTIAL HYPERTENSION: ICD-10-CM

## 2025-08-26 DIAGNOSIS — I65.23 CAROTID ARTERY STENOSIS, ASYMPTOMATIC, BILATERAL: ICD-10-CM

## 2025-08-26 LAB
ATRIAL RATE (BPM): 55
P AXIS (DEGREES): 75
PR-INTERVAL (MSEC): 142
QRS-INTERVAL (MSEC): 78
QT-INTERVAL (MSEC): 444
QTC: 424
R AXIS (DEGREES): -47
REPORT TEXT: NORMAL
T AXIS (DEGREES): 54
VENTRICULAR RATE EKG/MIN (BPM): 55

## 2025-08-26 SDOH — HEALTH STABILITY: MENTAL HEALTH: HOW OFTEN DO YOU HAVE 6 OR MORE DRINKS ON ONE OCCASION?: NEVER

## 2025-08-26 SDOH — HEALTH STABILITY: MENTAL HEALTH: HOW OFTEN DO YOU HAVE A DRINK CONTAINING ALCOHOL?: NEVER

## 2025-08-26 ASSESSMENT — PATIENT HEALTH QUESTIONNAIRE - PHQ9
SUM OF ALL RESPONSES TO PHQ9 QUESTIONS 1 AND 2: 0
SUM OF ALL RESPONSES TO PHQ9 QUESTIONS 1 AND 2: 0
2. FEELING DOWN, DEPRESSED OR HOPELESS: NOT AT ALL
1. LITTLE INTEREST OR PLEASURE IN DOING THINGS: NOT AT ALL

## 2025-09-08 ENCOUNTER — APPOINTMENT (OUTPATIENT)
Dept: CARDIOLOGY | Age: 75
End: 2025-09-08
Attending: REGISTERED NURSE

## 2026-02-17 ENCOUNTER — APPOINTMENT (OUTPATIENT)
Dept: CARDIOLOGY | Age: 76
End: 2026-02-17

## (undated) DEVICE — 35 ML SYRINGE REGULAR TIP: Brand: MONOJECT

## (undated) DEVICE — STERILE LATEX POWDER-FREE SURGICAL GLOVESWITH NITRILE COATING: Brand: PROTEXIS

## (undated) DEVICE — Device: Brand: CUSTOM PROCEDURE KIT

## (undated) DEVICE — MEDI-VAC NON-CONDUCTIVE SUCTION TUBING 6MM X 1.8M (6FT.) L: Brand: CARDINAL HEALTH

## (undated) DEVICE — Device: Brand: DEFENDO AIR/WATER/SUCTION AND BIOPSY VALVE

## (undated) DEVICE — LINE MNTR ADLT SET O2 INTMD

## (undated) NOTE — LETTER
Patient Name: Jarad Jama  YOB: 1950          MRN :  [id number]  Date: 9/16/2021     Dx:  Hiatal hernia (K44.9)           Authorized # of Visits:  [id number]       Referring MD: Servando Gomez  Next MD visit: none scheduled    Medication Changes since las